# Patient Record
Sex: FEMALE | Race: WHITE
[De-identification: names, ages, dates, MRNs, and addresses within clinical notes are randomized per-mention and may not be internally consistent; named-entity substitution may affect disease eponyms.]

---

## 2020-06-27 ENCOUNTER — HOSPITAL ENCOUNTER (OUTPATIENT)
Dept: HOSPITAL 9 - MADRAD | Age: 50
Discharge: HOME | End: 2020-06-27
Attending: NURSE PRACTITIONER
Payer: OTHER GOVERNMENT

## 2020-06-27 DIAGNOSIS — M25.562: Primary | ICD-10-CM

## 2020-06-27 DIAGNOSIS — M25.512: ICD-10-CM

## 2020-06-27 DIAGNOSIS — M54.2: ICD-10-CM

## 2020-06-27 DIAGNOSIS — Z91.81: ICD-10-CM

## 2020-06-27 PROCEDURE — 72040 X-RAY EXAM NECK SPINE 2-3 VW: CPT

## 2020-06-27 NOTE — RAD
LEFT KNEE 3 VIEWS:

 

HISTORY: 

Fall.

 

FINDINGS: 

The joint spaces are normally maintained.  There is no evidence of fracture.  No evidence of joint ef
fusion.  No significant degenerative change.

 

IMPRESSION: 

Unremarkable left knee.

 

POS: [facility]

## 2020-06-27 NOTE — RAD
LEFT SHOULDER 3 VIEWS:

 

HISTORY: 

Fall with injury.

 

FINDINGS: 

Humeral head is normally positioned.  AC joint normally aligned.  No fracture.  No dislocation.  

 

IMPRESSION: 

Unremarkable left shoulder.

 

POS: [facility]

## 2020-06-27 NOTE — RAD
CERVICAL SPINE 3 VIEWS:

 

HISTORY: 

Fall with injury.

 

FINDINGS: 

Cervical vertebrae maintain normal height and alignment.  The disk spaces are normally maintained.  P
osterior elements are normally aligned.  Minimal degenerative change.

 

IMPRESSION: 

Unremarkable cervical spine.

 

POS: [facility]

## 2020-08-10 ENCOUNTER — HOSPITAL ENCOUNTER (OUTPATIENT)
Dept: HOSPITAL 9 - MADRAD | Age: 50
Discharge: HOME | End: 2020-08-10
Attending: NURSE PRACTITIONER
Payer: OTHER GOVERNMENT

## 2020-08-10 DIAGNOSIS — S99.922A: Primary | ICD-10-CM

## 2020-08-10 NOTE — RAD
LEFT FOOT 3 VIEWS:

 

HISTORY: 

Injury, left foot pain.

 

FINDINGS/IMPRESSION: 

No acute fracture or dislocation is identified.

 

POS: [facility]

## 2020-08-26 ENCOUNTER — HOSPITAL ENCOUNTER (EMERGENCY)
Dept: HOSPITAL 9 - MADERS | Age: 50
Discharge: LEFT BEFORE DISCHARGE | End: 2020-08-26
Payer: OTHER GOVERNMENT

## 2020-08-26 DIAGNOSIS — Z79.84: ICD-10-CM

## 2020-08-26 DIAGNOSIS — E78.1: ICD-10-CM

## 2020-08-26 DIAGNOSIS — L03.116: Primary | ICD-10-CM

## 2020-08-26 DIAGNOSIS — Z79.82: ICD-10-CM

## 2020-08-26 DIAGNOSIS — F32.9: ICD-10-CM

## 2020-08-26 DIAGNOSIS — I25.2: ICD-10-CM

## 2020-08-26 DIAGNOSIS — E11.40: ICD-10-CM

## 2020-08-26 DIAGNOSIS — N30.00: ICD-10-CM

## 2020-08-26 DIAGNOSIS — E78.5: ICD-10-CM

## 2020-08-26 DIAGNOSIS — F41.9: ICD-10-CM

## 2020-08-26 DIAGNOSIS — I25.10: ICD-10-CM

## 2020-08-26 DIAGNOSIS — I10: ICD-10-CM

## 2020-08-26 DIAGNOSIS — Z79.899: ICD-10-CM

## 2020-08-26 DIAGNOSIS — E78.00: ICD-10-CM

## 2020-08-26 LAB
ALBUMIN SERPL BCG-MCNC: 3 G/DL (ref 3.5–5)
ALP SERPL-CCNC: 91 U/L (ref 40–110)
ALT SERPL W P-5'-P-CCNC: 11 U/L (ref 8–55)
ANION GAP SERPL CALC-SCNC: 17 MMOL/L (ref 10–20)
APTT PPP: 25.2 SEC (ref 22.9–36.1)
AST SERPL-CCNC: 11 U/L (ref 5–34)
BACTERIA UR QL AUTO: (no result) HPF
BASOPHILS # BLD AUTO: 0.2 THOU/UL (ref 0–0.2)
BASOPHILS NFR BLD AUTO: 1.1 % (ref 0–1)
BILIRUB SERPL-MCNC: (no result) MG/DL (ref 0.2–1.2)
BUN SERPL-MCNC: 19 MG/DL (ref 9.8–20.1)
CALCIUM SERPL-MCNC: 8.4 MG/DL (ref 7.8–10.44)
CHLORIDE SERPL-SCNC: 98 MMOL/L (ref 98–107)
CO2 SERPL-SCNC: 22 MMOL/L (ref 22–29)
CREAT CL PREDICTED SERPL C-G-VRATE: 0 ML/MIN (ref 70–130)
EOSINOPHIL # BLD AUTO: 0.4 THOU/UL (ref 0–0.7)
EOSINOPHIL NFR BLD AUTO: 3.2 % (ref 0–10)
GLOBULIN SER CALC-MCNC: 4 G/DL (ref 2.4–3.5)
GLUCOSE SERPL-MCNC: 255 MG/DL (ref 70–105)
GLUCOSE UR STRIP-MCNC: 500 MG/DL
HGB BLD-MCNC: 14.3 G/DL (ref 12–16)
INR PPP: 0.8
LYMPHOCYTES # BLD AUTO: 3.1 THOU/UL (ref 1.2–3.4)
LYMPHOCYTES NFR BLD AUTO: 22.5 % (ref 21–51)
MCH RBC QN AUTO: 29.9 PG (ref 27–31)
MCV RBC AUTO: 90.2 FL (ref 78–98)
MONOCYTES # BLD AUTO: 0.8 THOU/UL (ref 0.11–0.59)
MONOCYTES NFR BLD AUTO: 5.6 % (ref 0–10)
NEUTROPHILS # BLD AUTO: 9.4 THOU/UL (ref 1.4–6.5)
NEUTROPHILS NFR BLD AUTO: 67.6 % (ref 42–75)
PLATELET # BLD AUTO: 451 THOU/UL (ref 130–400)
POTASSIUM SERPL-SCNC: 4.9 MMOL/L (ref 3.5–5.1)
PROT UR STRIP.AUTO-MCNC: (no result) MG/DL
PROTHROMBIN TIME: 11.1 SEC (ref 12–14.7)
RBC # BLD AUTO: 4.79 MILL/UL (ref 4.2–5.4)
RBC UR QL AUTO: (no result) HPF (ref 0–3)
SODIUM SERPL-SCNC: 132 MMOL/L (ref 136–145)
SP GR UR STRIP: 1.02 (ref 1–1.03)
WBC # BLD AUTO: 13.9 THOU/UL (ref 4.8–10.8)
WBC UR QL AUTO: (no result) HPF (ref 0–3)

## 2020-08-26 PROCEDURE — 87086 URINE CULTURE/COLONY COUNT: CPT

## 2020-08-26 PROCEDURE — 80053 COMPREHEN METABOLIC PANEL: CPT

## 2020-08-26 PROCEDURE — 81015 MICROSCOPIC EXAM OF URINE: CPT

## 2020-08-26 PROCEDURE — 87077 CULTURE AEROBIC IDENTIFY: CPT

## 2020-08-26 PROCEDURE — 96367 TX/PROPH/DG ADDL SEQ IV INF: CPT

## 2020-08-26 PROCEDURE — 85025 COMPLETE CBC W/AUTO DIFF WBC: CPT

## 2020-08-26 PROCEDURE — 96375 TX/PRO/DX INJ NEW DRUG ADDON: CPT

## 2020-08-26 PROCEDURE — 83605 ASSAY OF LACTIC ACID: CPT

## 2020-08-26 PROCEDURE — 81003 URINALYSIS AUTO W/O SCOPE: CPT

## 2020-08-26 PROCEDURE — 85610 PROTHROMBIN TIME: CPT

## 2020-08-26 PROCEDURE — 96365 THER/PROPH/DIAG IV INF INIT: CPT

## 2020-08-26 PROCEDURE — 85730 THROMBOPLASTIN TIME PARTIAL: CPT

## 2020-08-26 PROCEDURE — 87040 BLOOD CULTURE FOR BACTERIA: CPT

## 2020-08-26 NOTE — RAD
EXAM: 3 views of the left ankle



HISTORY: Ankle pain and swelling



COMPARISON: None



FINDINGS: 3 views of the left ankle shows no evidence of acute fracture or dislocation. Mild diffuse 
soft tissue swelling is seen. No degenerative changes are present. A chronic ossific fragment is

seen adjacent to the medial malleolus.



IMPRESSION: No evidence of acute osseous abnormality.



Reported By: Mik Benavidez 

Electronically Signed:  8/26/2020 8:28 PM

## 2020-11-19 ENCOUNTER — HOSPITAL ENCOUNTER (OUTPATIENT)
Dept: HOSPITAL 92 - BICCT | Age: 50
Discharge: HOME | End: 2020-11-19
Attending: INTERNAL MEDICINE
Payer: OTHER GOVERNMENT

## 2020-11-19 DIAGNOSIS — I70.1: ICD-10-CM

## 2020-11-19 DIAGNOSIS — K55.1: ICD-10-CM

## 2020-11-19 DIAGNOSIS — I70.213: Primary | ICD-10-CM

## 2020-11-19 PROCEDURE — 75635 CT ANGIO ABDOMINAL ARTERIES: CPT

## 2020-11-19 NOTE — CT
CTA OF THE ABDOMEN AND PELVIS WITH BILATERAL LOWER EXTREMITY RUNOFF UTILIZING IV CONTRASTAND 3D REFOR
MATTED IMAGIN20

 

COMPARISON: 

None. 

 

FINDINGS: 

 

ABDOMEN AND PELVIS:

There is mild subsegmental volume loss along both lower lobes. 

 

Gallbladder is surgically absent. The visualized liver, pancreas, adrenal glands and spleen appear wi
thin normal limits. There is mild lobulation involving the kidneys bilaterally, nonspecific. 

 

No free fluid or enlarged lymph nodes are evident within the abdomen. Small free fluid in the lower p
leigh is nonspecific. There is scattered diverticula involving the colon without evidence of active d
iverticulitis. There is a mild amount of retained stool within the colon. The appendix is not definit
ely seen. Small free fluid in the lower pelvis is nonspecific. There is scattered diverticula involvi
ng the colon without evidence of active diverticulitis. There is a mild amount of retained stool in t
he colon. The appendix is not definitely seen. Small bowel is of normal caliber. 

 

No acute osseous abnormality is evident. 

 

VASCULATURE:

There is moderate atherosclerotic irregularity involving the abdominopelvic vasculature. 

 

The celiac is widely patent. 

 

There is some mild to moderate atherosclerotic irregularity involving the mid to distal SMA with appr
oximately 30% luminal caliber narrowing.

 

There is moderate 50% luminal caliber narrowing involving the origin and proximal aspect of the right
 renal artery. There is mild luminal narrowing involving the proximal left renal artery. TIFFANY is paten
t. 

 

There is high grade stenosis approaching near occlusion involving both common iliac arteries with mul
tiple segments of the common iliac arteries bilaterally. 

 

There is high grade 75% stenosis involving the right external iliac artery. There is 60 to 75% stenos
is involving the right common femoral artery. There is multifocal high grade stenosis involving the m
id to distal right superficial femoral artery. There is multifocal high grade stenosis involving the 
right popliteal artery and tibioperoneal trunk. There is at least two vessel runoff to the level of t
he ankle via the peroneal and anterior tibial artery. 

 

There is complete occlusion involving the origin of the left external iliac artery. There is some rec
onstitution of flow within the proximal left external iliac artery. There is multifocal mild to moder
ate atherosclerotic irregularity. There is moderate to high grade stenosis involving the common femor
al artery. There is complete occlusion of the left superficial femoral artery with reconstitution at 
the level of the distal SFA near the adductor hiatus. There is multifocal high grade stenosis involvi
ng the left popliteal artery with more reconstitution seen at the distal left popliteal artery. Tibio
peroneal trunk is patent. The anterior tibial artery is occluded from its origin but reconstitutes wi
thin the proximal foreleg. There is at least two vessel runoff via the peroneal and anterior tibial a
rtery to the level of the ankle. 

 

IMPRESSION: 

1.      Severe atherosclerotic disease of both lower extremities. There is multifocal near complete o
cclusion of both common iliac arteries. There is complete occlusion of the origin of the left externa
l iliac artery. There is complete occlusion of the left superficial femoral artery with reconstitutio
n at the level of the distal SFA and popliteal artery. There is two vessel runoff to the level of the
 ankle via the anterior tibial artery and left peroneal artery. The left anterior tibial artery is oc
cluded from its origin but does reconstitute within the proximal foreleg. 

2.      Severe diffuse atherosclerotic narrowing involving the right external iliac artery with sever
e narrowing involving the right common femoral artery. There is multifocal high grade stenosis involv
ing the right superficial femoral artery and right popliteal artery. There is occlusion of the right 
posterior tibial artery with two vessel runoff to the level of the right ankle via the right anterior
 tibial artery and right peroneal artery. 

3.      Moderate narrowing involving the origin of the proximal aspect of the right renal artery. Mil
d narrowing involving the proximal aspect of the left renal artery. 

4.      Mild to moderate narrowing involving the mid to distal SMA. 

 

POS: [facility]

## 2020-11-26 ENCOUNTER — HOSPITAL ENCOUNTER (INPATIENT)
Dept: HOSPITAL 92 - ERS | Age: 50
LOS: 5 days | Discharge: HOME | DRG: 253 | End: 2020-12-01
Attending: INTERNAL MEDICINE | Admitting: INTERNAL MEDICINE
Payer: OTHER GOVERNMENT

## 2020-11-26 VITALS — BODY MASS INDEX: 24.7 KG/M2

## 2020-11-26 DIAGNOSIS — Z88.0: ICD-10-CM

## 2020-11-26 DIAGNOSIS — E11.65: ICD-10-CM

## 2020-11-26 DIAGNOSIS — E11.51: ICD-10-CM

## 2020-11-26 DIAGNOSIS — Z88.5: ICD-10-CM

## 2020-11-26 DIAGNOSIS — B18.2: ICD-10-CM

## 2020-11-26 DIAGNOSIS — E11.621: ICD-10-CM

## 2020-11-26 DIAGNOSIS — Z79.84: ICD-10-CM

## 2020-11-26 DIAGNOSIS — E11.42: ICD-10-CM

## 2020-11-26 DIAGNOSIS — L97.529: ICD-10-CM

## 2020-11-26 DIAGNOSIS — F32.9: ICD-10-CM

## 2020-11-26 DIAGNOSIS — E78.00: ICD-10-CM

## 2020-11-26 DIAGNOSIS — I11.0: ICD-10-CM

## 2020-11-26 DIAGNOSIS — I48.91: ICD-10-CM

## 2020-11-26 DIAGNOSIS — M19.90: ICD-10-CM

## 2020-11-26 DIAGNOSIS — I70.248: ICD-10-CM

## 2020-11-26 DIAGNOSIS — Z88.8: ICD-10-CM

## 2020-11-26 DIAGNOSIS — I25.5: ICD-10-CM

## 2020-11-26 DIAGNOSIS — N17.9: ICD-10-CM

## 2020-11-26 DIAGNOSIS — I96: ICD-10-CM

## 2020-11-26 DIAGNOSIS — E78.5: ICD-10-CM

## 2020-11-26 DIAGNOSIS — Z95.1: ICD-10-CM

## 2020-11-26 DIAGNOSIS — L03.116: ICD-10-CM

## 2020-11-26 DIAGNOSIS — Z53.29: ICD-10-CM

## 2020-11-26 DIAGNOSIS — I25.10: ICD-10-CM

## 2020-11-26 DIAGNOSIS — F41.9: ICD-10-CM

## 2020-11-26 DIAGNOSIS — E87.5: ICD-10-CM

## 2020-11-26 DIAGNOSIS — E11.52: Primary | ICD-10-CM

## 2020-11-26 DIAGNOSIS — I25.2: ICD-10-CM

## 2020-11-26 DIAGNOSIS — Z90.49: ICD-10-CM

## 2020-11-26 DIAGNOSIS — L97.829: ICD-10-CM

## 2020-11-26 DIAGNOSIS — Z79.899: ICD-10-CM

## 2020-11-26 LAB
ALBUMIN SERPL BCG-MCNC: 3.1 G/DL (ref 3.5–5)
ALP SERPL-CCNC: 107 U/L (ref 40–110)
ALT SERPL W P-5'-P-CCNC: (no result) U/L (ref 8–55)
ANION GAP SERPL CALC-SCNC: 13 MMOL/L (ref 10–20)
ANION GAP SERPL CALC-SCNC: 16 MMOL/L (ref 10–20)
AST SERPL-CCNC: 9 U/L (ref 5–34)
BASOPHILS # BLD AUTO: 0.1 THOU/UL (ref 0–0.2)
BASOPHILS NFR BLD AUTO: 0.5 % (ref 0–1)
BILIRUB SERPL-MCNC: 0.2 MG/DL (ref 0.2–1.2)
BUN SERPL-MCNC: 17 MG/DL (ref 9.8–20.1)
BUN SERPL-MCNC: 22 MG/DL (ref 9.8–20.1)
CALCIUM SERPL-MCNC: 7.7 MG/DL (ref 7.8–10.44)
CALCIUM SERPL-MCNC: 8.8 MG/DL (ref 7.8–10.44)
CHLORIDE SERPL-SCNC: 103 MMOL/L (ref 98–107)
CHLORIDE SERPL-SCNC: 110 MMOL/L (ref 98–107)
CO2 SERPL-SCNC: 19 MMOL/L (ref 22–29)
CO2 SERPL-SCNC: 24 MMOL/L (ref 22–29)
CREAT CL PREDICTED SERPL C-G-VRATE: 0 ML/MIN (ref 70–130)
CREAT CL PREDICTED SERPL C-G-VRATE: 50 ML/MIN (ref 70–130)
CRP SERPL-MCNC: 7.18 MG/DL
EOSINOPHIL # BLD AUTO: 0.3 THOU/UL (ref 0–0.7)
EOSINOPHIL NFR BLD AUTO: 1.6 % (ref 0–10)
GLOBULIN SER CALC-MCNC: 4.4 G/DL (ref 2.4–3.5)
GLUCOSE SERPL-MCNC: 206 MG/DL (ref 70–105)
GLUCOSE SERPL-MCNC: 234 MG/DL (ref 70–105)
HGB BLD-MCNC: 12.5 G/DL (ref 12–16)
LYMPHOCYTES # BLD: 1.9 THOU/UL (ref 1.2–3.4)
LYMPHOCYTES NFR BLD AUTO: 10.7 % (ref 21–51)
MCH RBC QN AUTO: 30.6 PG (ref 27–31)
MCV RBC AUTO: 92.8 FL (ref 78–98)
MONOCYTES # BLD AUTO: 1.3 THOU/UL (ref 0.11–0.59)
MONOCYTES NFR BLD AUTO: 7.7 % (ref 0–10)
NEUTROPHILS # BLD AUTO: 13.8 THOU/UL (ref 1.4–6.5)
NEUTROPHILS NFR BLD AUTO: 79.6 % (ref 42–75)
PLATELET # BLD AUTO: 325 THOU/UL (ref 130–400)
POTASSIUM SERPL-SCNC: 4.2 MMOL/L (ref 3.5–5.1)
POTASSIUM SERPL-SCNC: 5.8 MMOL/L (ref 3.5–5.1)
RBC # BLD AUTO: 4.08 MILL/UL (ref 4.2–5.4)
SODIUM SERPL-SCNC: 137 MMOL/L (ref 136–145)
SODIUM SERPL-SCNC: 138 MMOL/L (ref 136–145)
WBC # BLD AUTO: 17.4 THOU/UL (ref 4.8–10.8)

## 2020-11-26 PROCEDURE — 87070 CULTURE OTHR SPECIMN AEROBIC: CPT

## 2020-11-26 PROCEDURE — 76942 ECHO GUIDE FOR BIOPSY: CPT

## 2020-11-26 PROCEDURE — 93010 ELECTROCARDIOGRAM REPORT: CPT

## 2020-11-26 PROCEDURE — 83605 ASSAY OF LACTIC ACID: CPT

## 2020-11-26 PROCEDURE — 36416 COLLJ CAPILLARY BLOOD SPEC: CPT

## 2020-11-26 PROCEDURE — U0003 INFECTIOUS AGENT DETECTION BY NUCLEIC ACID (DNA OR RNA); SEVERE ACUTE RESPIRATORY SYNDROME CORONAVIRUS 2 (SARS-COV-2) (CORONAVIRUS DISEASE [COVID-19]), AMPLIFIED PROBE TECHNIQUE, MAKING USE OF HIGH THROUGHPUT TECHNOLOGIES AS DESCRIBED BY CMS-2020-01-R: HCPCS

## 2020-11-26 PROCEDURE — 85347 COAGULATION TIME ACTIVATED: CPT

## 2020-11-26 PROCEDURE — 93005 ELECTROCARDIOGRAM TRACING: CPT

## 2020-11-26 PROCEDURE — 71045 X-RAY EXAM CHEST 1 VIEW: CPT

## 2020-11-26 PROCEDURE — 85025 COMPLETE CBC W/AUTO DIFF WBC: CPT

## 2020-11-26 PROCEDURE — 93306 TTE W/DOPPLER COMPLETE: CPT

## 2020-11-26 PROCEDURE — 37221: CPT

## 2020-11-26 PROCEDURE — 86140 C-REACTIVE PROTEIN: CPT

## 2020-11-26 PROCEDURE — 94640 AIRWAY INHALATION TREATMENT: CPT

## 2020-11-26 PROCEDURE — 87077 CULTURE AEROBIC IDENTIFY: CPT

## 2020-11-26 PROCEDURE — 0LBM0ZZ EXCISION OF LEFT UPPER LEG TENDON, OPEN APPROACH: ICD-10-PCS | Performed by: SURGERY

## 2020-11-26 PROCEDURE — 87635 SARS-COV-2 COVID-19 AMP PRB: CPT

## 2020-11-26 PROCEDURE — 80202 ASSAY OF VANCOMYCIN: CPT

## 2020-11-26 PROCEDURE — 87205 SMEAR GRAM STAIN: CPT

## 2020-11-26 PROCEDURE — 87040 BLOOD CULTURE FOR BACTERIA: CPT

## 2020-11-26 PROCEDURE — C1725 CATH, TRANSLUMIN NON-LASER: HCPCS

## 2020-11-26 PROCEDURE — 36415 COLL VENOUS BLD VENIPUNCTURE: CPT

## 2020-11-26 PROCEDURE — 96365 THER/PROPH/DIAG IV INF INIT: CPT

## 2020-11-26 PROCEDURE — 87186 SC STD MICRODIL/AGAR DIL: CPT

## 2020-11-26 PROCEDURE — 80053 COMPREHEN METABOLIC PANEL: CPT

## 2020-11-26 PROCEDURE — 96375 TX/PRO/DX INJ NEW DRUG ADDON: CPT

## 2020-11-26 PROCEDURE — 85652 RBC SED RATE AUTOMATED: CPT

## 2020-11-26 PROCEDURE — 83880 ASSAY OF NATRIURETIC PEPTIDE: CPT

## 2020-11-26 PROCEDURE — 80048 BASIC METABOLIC PNL TOTAL CA: CPT

## 2020-11-26 RX ADMIN — INSULIN LISPRO PRN UNIT: 100 INJECTION, SOLUTION INTRAVENOUS; SUBCUTANEOUS at 22:44

## 2020-11-26 RX ADMIN — CLINDAMYCIN PHOSPHATE SCH MLS: 900 INJECTION, SOLUTION INTRAVENOUS at 11:51

## 2020-11-26 RX ADMIN — CLINDAMYCIN PHOSPHATE SCH MLS: 900 INJECTION, SOLUTION INTRAVENOUS at 18:15

## 2020-11-26 NOTE — PDOC.HHP
Hospitalist HPI





- History of Present Illness


Left lower extremity wound pain


History of Present Illness: 





Ms. Almazan is a 49-year-old female with a past medical history of severe 

peripheral vascular disease, chronic wound to left lower extremity, type 2 

diabetes mellitus, hypertension, hyperlipidemia, anxiety and depression, 

hepatitis C, coronary artery disease status post CABG who presents to the 

emergency room for worsening left lower extremity wound pain.  Patient has a 

chronic nonhealing arterial ulcer to her left lower shin.  She has a history of 

multiple wound infections with recent admission in August for infected ulcer.  

Cultures at that time were positive for staph aureus.  Patient reports that over

the past few days she has noticed increased pain to her left lower extremity and

increasing redness.  There was plans for her to start a wound VAC as an 

outpatient sometime next week.  Also of note patient underwent on 1119 aorta CTA

with runoff which revealed severe peripheral vascular disease.





In emergency room initial vital signs 151/117, 70, 18, 95% on room air, 98.3.  

EKG showed normal sinus rhythm.  H/H 12.5/37.9, white blood cell count 17.4.  

BUN/CR 22/1.7.  Sodium 137, potassium 5.8.  Glucose 234.  Lactic acid 1.1 left 

lower extremity x-ray of tib-fib showed no signs of osseous changes, but 

question of air and subcutaneous tissues.  Patient received vancomycin, cefepime

and 2 L of normal saline in the emergency room.





Hospitalist ROS





- Review of Systems


Constitutional: reports: malaise.  denies: fever, chills, sweats, weakness, 

other


Eyes: denies: pain, vision change, conjunctivae inflammation, eyelid 

inflammation, redness, other


ENT: denies: ear pain, ear discharge, nose pain, nose discharge, nose 

congestion, mouth pain, mouth swelling, throat pain, throat swelling, other


Respiratory: denies: cough, dry, shortness of breath, hemoptysis, SOB with 

excertion, pleuritic pain, sputum, wheezing, other


Cardiovascular: denies: chest pain, palpitations, orthopnea, paroxysmal noc. 

dyspnea, edema, light headedness, other


Gastrointestinal: denies: nausea, vomiting, abdominal pain, diarrhea, 

constipation, melena, hematochezia, other


Genitourinary: denies: dysuria, frequency, incontinence, hematuria, retention, 

other


Musculoskeletal: reports: leg pain, foot pain.  denies: neck pain, shoulder 

pain, arm pain, back pain, hand pain, other


Skin: reports: rash, lesions


Neurological: denies: weakness, numbness, incoordination, change in speech, 

confusion, seizures, other





- Medication


Medications: 


Current medications include





Clopidogrel 75 mg daily


Furosemide 40 mg daily as needed


Gabapentin 100 mg twice daily as needed


Metformin 1000 mg twice daily


Metoprolol 25 mg twice daily


Nitroglycerin 0.4 mg as needed


Lipitor 80 mg daily


Citalopram 40 mg daily


Januvia 100 mg daily





Patient reports allergy to meperidine, codeine, penicillin





Hospitalist History





- Past Medical History


Other Medical History: 





Past medical history includes





Severe peripheral vascular disease


Type 2 diabetes


Hypertension


Hyperlipidemia


Anxiety and depression


Chronic arterial ulcer to left lower extremity with recurrent cellulitis


Hepatitis C


Coronary artery disease


Myocardial infarct





- Past Surgical History


Other Surgical History: 





Past surgical history includes





- Family History


Other Family History: 





Family history of father with peripheral vascular disease who passed at age 46





- Social History


Smoking Status: Never smoker


Alcohol: reports: None


Drugs: reports: none


Living Situation: With Family


Activity level: independent ambulation





- Exam


General Appearance: NAD, awake alert


Eye: PERRL, anicteric sclera


ENT: normocephalic atraumatic, no oropharyngeal lesions, moist mucosa


Neck: supple, symmetric, no JVD, no thyromegaly, no lymphadenopathy, no carotid 

bruit


Heart: RRR, no murmur, no gallops, no rubs, normal peripheral pulses


Heart - other findings: Dopplerable pulses with monophasic waveforms to DP, PT


Respiratory: CTAB, no wheezes, no rales, no ronchi, normal chest expansion, no 

tachypnea, normal percussion


Gastrointestinal: soft, non-tender, non-distended, normal bowel sounds, no 

palpable masses, no hepatomegaly, no splenomegaly, no bruit


Extremities - other findings: Extensive arterial ulcer with dry gangrene to left

lower extremity. 


Skin: negative: no lesions (Bone and tendon exposed)


Skin - other findings: Area surrounding ulcer erythematous and indurated


Neurological: cranial nerve grossly intact, normal sensation to touch, no 

weakness, no focal deficits, no new deficit


Musculoskeletal: normal tone, normal strength


Psychiatric: normal affect, normal behavior, A&O x 3, lethargic





Hospitalist Results





- Labs


Result Diagrams: 


                                 11/26/20 02:10





                                 11/26/20 09:15


Lab results: 


                                        











WBC  17.4 thou/uL (4.8-10.8)  H  11/26/20  02:10    


 


Hgb  12.5 g/dL (12.0-16.0)   11/26/20  02:10    


 


Hct  37.9 % (36.0-47.0)   11/26/20  02:10    


 


MCV  92.8 fL (78.0-98.0)   11/26/20  02:10    


 


Plt Count  325 thou/uL (130-400)   11/26/20  02:10    


 


Neutrophils %  79.6 % (42.0-75.0)  H  11/26/20  02:10    


 


ESR Westergren  46 mm/hr (Less than 30)  H  11/26/20  02:10    


 


Sodium  137 mmol/L (136-145)   11/26/20  02:10    


 


Potassium  5.8 mmol/L (3.5-5.1)  H  11/26/20  02:10    


 


Chloride  103 mmol/L ()   11/26/20  02:10    


 


Carbon Dioxide  24 mmol/L (22-29)   11/26/20  02:10    


 


BUN  22 mg/dL (9.8-20.1)  H  11/26/20  02:10    


 


Creatinine  1.71 mg/dL (0.6-1.1)  H  11/26/20  02:10    


 


Glucose  234 mg/dL ()  H  11/26/20  02:10    


 


Lactic Acid  1.1 mmol/L (0.5-2.2)   11/26/20  02:10    


 


Calcium  8.8 mg/dL (7.8-10.44)   11/26/20  02:10    


 


Total Bilirubin  0.2 mg/dL (0.2-1.2)   11/26/20  02:10    


 


AST  9 U/L (5-34)   11/26/20  02:10    


 


ALT  Less than  7 U/L (8-55)  L  11/26/20  02:10    


 


Alkaline Phosphatase  107 U/L ()   11/26/20  02:10    


 


C-Reactive Protein  7.18 mg/dL (= or < 0.5)  H  11/26/20  02:10    


 


Serum Total Protein  7.5 g/dL (6.0-8.3)   11/26/20  02:10    


 


Albumin  3.1 g/dL (3.5-5.0)  L  11/26/20  02:10    














Hospitalist H&P A/P





- Plan


Plan: 





Infected arterial ulcer


49-year-old female with severe peripheral vascular disease and chronic 

nonhealing arterial ulcer to left lower extremity presents with acute pain and 

worsening redness to surrounding skin. WBC 17.4, Lactic acid 2.1. Currently not 

meeting SIRS criteria. Concern for osteomyelitis giving extent of wound.  

Initial plain film showed no signs of bone abnormalities and question of air in 

subcutaneous tissues.  The wound is very extensive and there is possibility that

this area may be due to tracking, however will add clindamycin for anaerobic 

coverage and obtain stat MRI to further assess for osteomyelitis or gas 

gangrene. Wound does have dry gangrene. Bone and tendon exposed.  No crepitus on

exam.  DP and PT pulses  dopperable, but monophasic. There was reportedly plan 

for patient to start a wound VAC as an outpatient in the coming week however 

patient's pain and redness made her present to the ER.  While a wound VAC may 

help to a small extent, likely patient needs vascular intervention to restore 

blood supply in order for wound to heal. Will consult cardiovascular surgery for

further recommendations.





Plan


Stat MRI of left lower extremity wound


Continue IV vancomycin, cefepime, clindamycin


Trend white blood cell count, fever curve, lactic acid


Cardiovascular surgery consult


Stat General surgery consult for question nec fasc





Peripheral vascular disease


Significant peripheral vascular disease.  Patient had CTA aorta with runoff 

which showed near complete occlusion of bilateral iliacs, complete occlusion of 

left SFA with reconstitution.  Patient does have two-vessel runoff to left lower

extremity.  DP and PT pulses dopplerable but monophasic.  Patient with dependent

rubor.  Leg is warm.  Patient on aspirin and Plavix at home.





Plan


Continue aspirin, Plavix


Cardiovascular consult for question vascular interventionrecommendations 

appreciated





Acute kidney injury


On presentation BUN/CR 22/1.71.  Baseline creatinine less than 1.  Patient 

received 2 L of IV fluid in emergency room.  Likely prerenal.





Plan


Continue to monitor kidney function


Avoid nephrotoxic agents when possible


Renal dosing as appropriate





Hyperkalemia


Patient with elevated potassium to 5.8.  BUN/CR 22/1.71.  No EKG changes. 

Calcium to stabilize myocardium. Will repeat to confirm sample has not hemolyzed

and further treat if needed.





Plan


-Calcium gluconate


-STAT repeat BMP





Coronary artery disease


History of coronary artery disease status post CABG.  Patient on aspirin, 

Plavix, metoprolol, statin.  We will continue home medications.





Plan


Continue aspirin, statin


Continue Plavix, metoprolol





Type 2 diabetes mellitus


Type 2 diabetes on Metformin.  Will hold in setting of BRIANNA.  Will place patient 

on insulin sliding scale and ACHS glucose checks.





Plan


ISS


ACHS glucose checks


Hold home Metformin





Hyperlipidemia


Continue home statin





Hepatitis C


Patient with history of hepatitis C infection.  LFTs within normal limits.  Does

not appear that patient has undergone treatment for this.  Patient will need 

outpatient follow-up with primary care for further management.





Anxiety/depression


Continue home Celexa.  Patient denies SI/HI.





DVT prophylaxis - heparin SQ


FULL CODE





Case discussed with attending physician, Dr. Guerrero.

## 2020-11-26 NOTE — CON
DATE OF CONSULTATION:  11/26/2020



HISTORY OF PRESENT ILLNESS:  Ms. Almazan is a 49-year-old woman who has a

longstanding left leg anterior compartment open wound with dry gangrene.  There are

exposed tendons and her tibia is exposed.  She has known peripheral vascular disease

and seen by Dr. Burton in the past.  There have been plans made for angiograms at

some point, but this has been performed by the [facility]. 



She does walk at home.  She does not describe any claudication, although she does

not walk much with this open wound currently.  She smoked up until one year ago when

she had coronary bypass surgery.  This was performed in [place].  She had a CT

angiogram performed in earlier November, which shows bilateral iliac stenoses with

short-segment occlusion on the left.  She has an occluded left superficial femoral

artery.  Popliteal artery reconstitutes via collaterals with runoff into the lower

leg.  On the right, she has similar appearing iliofemoral segment.  Her superficial

femoral artery has multifocal disease. 



PAST MEDICAL HISTORY:  

1. Coronary artery disease.

2. Peripheral vascular disease.

3. Diabetes mellitus.

4. Hypertension.

5. Dyslipidemia.

6. Anxiety.

7. Depression.

8. Hepatitis C.



PAST SURGICAL HISTORY:  Coronary artery bypass grafting.



SOCIAL HISTORY:  She quit smoking a year ago.  She does not use alcohol.



CURRENT MEDICATIONS:  

1. Plavix 75 mg daily.

2. Lasix 40 mg daily p.r.n.

3. Gabapentin 100 mg b.i.d.

4. Metformin 1000 mg b.i.d.

5. Metoprolol 25 mg daily.

6. Lipitor 80 mg daily.

7. Citalopram 40 mg daily.

8. Januvia 100 mg daily.



ALLERGIES:  MEPERIDINE, CODEINE, PENICILLIN, AND ORANGE.



REVIEW OF SYSTEMS:  A 10-point review of systems is performed and is negative except

as above. 



PHYSICAL EXAMINATION:

GENERAL:  This is a well-developed, well-nourished diminutive woman. 

VITAL SIGNS:  Height 5 feet 2 inches, weight is 129 pounds, temperature is 98.4,

pulse is 68 and regular, and blood pressure is 128/74. 

HEENT:  Sclerae nonicteric. 

NECK:  Supple with no adenopathy.  She does not have carotid bruits. 

CHEST:  Clear bilaterally. 

HEART:  Rhythm is regular without murmur. 

ABDOMEN:  Soft and nontender with no mass. 

EXTREMITIES:  Over her left shin, she has a large approximately 15 to 20 cm long

open area with exposed tendon and tibia.  She has another abrasion on her knee.  On

the right leg, there are scratches all over her leg, which she says came from a cat.

 She has small ulcerations on the tips of her toes 1 through 3. 

VASCULAR:  I cannot palpate femoral pulses.  She has monophasic Doppler signals in

both femorals and popliteal arteries.  On the right, she has a monophasic dorsalis

pedis. 



ASSESSMENT AND PLAN:  Unsalvageable left lower leg.  She is going to need an

amputation.  I think to try to give a below-knee amputation as much possibility that

heals we can.  It would be good if we can re-establish straight line flow down into

her femoral system by intervening on her iliac artery on the left.  I have discussed

angiograms and intervention with her and we will make plans for tomorrow. 







Job ID:  [id number]

## 2020-11-26 NOTE — CON
DATE OF CONSULTATION:  11/26/2020



REASON FOR CONSULTATION:  Left leg wound.



HISTORY OF PRESENT ILLNESS:  Ms. Almazan is a 49-year-old woman with known 
peripheral

vascular disease, who has had a wound on her left shin for 6 months.  She states

that she had been seeing Wound Care and they were trying to order a VAC for her,
but

due to various problems, they were unable to get this done and the ulcer has 
gotten

bigger.  She was apparently admitted in August for the same problem and treated 
with

antibiotics and wound care.  She has had increasing pain and redness to her left
leg

for the past few days to a week before she came to the hospital, so she decided 
to

come in and she was admitted through the emergency room. 



PAST MEDICAL HISTORY:  

1. Coronary artery disease status post bypass artery grafting using right 
saphenous

vein. 

2. Peripheral vascular disease with rest pain and ulceration.

3. Type 2 diabetes.

4. Hypertension.

5. Hyperlipidemia.

6. Anxiety.

7. Depression.

8. Hepatitis C.



MEDICATIONS:  Outpatient medications:

1. Plavix.

2. Lasix.

3. Gabapentin.

4. Metformin.

5. Metoprolol.

6. Nitroglycerin.

7. Lipitor.

8. Citalopram.

9. Januvia. 

Inpatient medications:

1. Amlodipine.

2. Lipitor.

3. Cefepime.

4. Celexa.

5. Clindamycin.

6. Plavix.

7. Gabapentin.

8. Sliding scale insulin.

9. Metoprolol.

10. Remeron.

11. Vancomycin.

12. Multiple p.r.n.



ALLERGIES:  CODEINE, MEPERIDINE, AND PENICILLIN, CAUSE A RASH.



PAST SURGICAL HISTORY:  Coronary artery bypass grafting.



FAMILY HISTORY:  Vascular disease.



SOCIAL HISTORY:  The patient does not smoke, use illicit drugs, or alcohol.  She
is

ambulatory, although this week she has not really been able to walk due to pain 
in

her foot. 



REVIEW OF SYSTEMS:  Ten system review of systems is negative except for the

following:  The patient does report rest pain, having to dangle her legs over 
the

edge of the bed in order to sleep at night.  She has constant pain in her left 
leg

due to the ulcer.  She has a small scab on the right foot, which has been 
present

for many months, but no ulcerations.  She has occasional chest pain, but is 
unable

to identify the triggers.  Her  feels that it is brought on by stress and

pain.  She denies shortness of breath or orthopnea.  She has some mild lower

abdominal discomfort, which is intermittent. 



PHYSICAL EXAMINATION:

VITAL SIGNS:  The patient is afebrile, heart rate 68, respirations 17, 98% 
saturated

on room air, and blood pressure 128/74. 

GENERAL:  An anxious-appearing woman, in moderate distress.  She is not flushed 
or

toxic in appearance.  She is not jaundiced or icteric. 

HEENT:  Unremarkable.  Pupils and facial movements symmetric. 

NECK:  Supple without lymphadenopathy or thyroid nodules. 

HEART:  Regular in its rate and rhythm without murmurs, rubs, or gallops. 

LUNGS:  Clear to auscultation bilaterally. 

ABDOMEN:  Soft and nondistended.  She has some minimal lower abdominal 
tenderness,

but no rigidity, rebound, or guarding and no palpable masses or hernias. 

EXTREMITIES:  Cool and poorly perfused.  She has shiny skin to both lower legs 
as

well as some muscle wasting.  She has no palpable pulses in the groin, 
popliteal,

dorsalis pedis, or posterior tibial areas.  She has a large full-thickness 
eschar

with exposed tendon overlying the left anterolateral shin with necrotic tissue 
and

eschar visible.  She has erythema of the leg below at this level.  She has a 
small

eschar of the left knee without surrounding erythema and with no fluctuance.  
The

eschar is slightly mobile and starting to come up along the edges.  She has a 
small

scab like lesion on her right foot, which she states has been present for 
several

months.  No ulcerations or cellulitis, however, on the right foot. 

NEUROLOGIC:  The patient has very limited dorsiflexion of the ankle and fairly

normal dorsiflexion of the toes.  Grossly intact sensation to light touch. 



RADIOLOGIC DATA:  MRI did not show any obvious osteomyelitis, although this was

limited by noncontrast study.  Plain films of the tibia and fibula were 
questionable

for possible gas in the soft tissues. 



LABORATORY DATA:  White count is elevated at 17,000 with a left shift, 
hematocrit

37, and platelets 325.  Electrolytes unremarkable.  Creatinine mildly elevated 
at

1.23, bicarb slightly low at 19, lactate normal at 1.1, C-reactive protein is

elevated at 7.18.  LFTs are unremarkable. 



ASSESSMENT:  Severe peripheral vascular disease with gangrene and rest pain and

imminent risk for loss of limb.  Dr. Bueno of CT Surgery has been consulted and

plans to do an angio tomorrow to see if he can improve the blood flow to her 
legs.

At this point, the patient is unlikely to heal debridement or a below-knee

amputation of the left leg and she already has exposed nonviable tendons.  
Attempted

limb salvage with wound care runs the risk of poor function of the foot with 
footdrop

and fall risk.  I have recommended a below-knee amputation, but the patient is 
not

ready to consider that at this time.  I will debride the wound at the bedside to
try

to define the depths of the wound and also determine whether there is any

significant tunneling, which might make below-knee amputation non-feasible.

Currently, it does appear that she has enough non-involved skin and soft-tissue 
to

perform a closed below-knee amputation, but if there is significant tunneling, 
then

this may not be the case.  The patient is agreeable with this plan.  We will get
the

wound care team involved tomorrow, but plan to just do wet-to-dry dressings 
after

debridement today.  I suspect that eventually the patient will decide to proceed
with

below-knee amputation. 







Job ID:  [id number]



MTDD

## 2020-11-26 NOTE — MRI
EXAM: MRI of the left tibia/fibula without contrast



HISTORY: Wound lower left leg.  Evaluate for osteomyelitis



COMPARISON:  x-ray 11/26/2020



TECHNIQUE: Multiplanar multisequence MR images were obtained of the left lower leg without contrast.



FINDINGS:

No marrow signal abnormality is seen in the visualized bones to suggest osteomyelitis. Soft tissue ed
ema is seen in the distal aspect of the leg. No focal fluid collection is seen in the soft tissues.



The muscles and tendons appear intact.



IMPRESSION: No significant marrow signal abnormality to suggest osteomyelitis. Please note that evalu
ation is limited without IV contrast.



Reported By: Mik Benavidez 

Electronically Signed:  11/26/2020 11:23 AM

## 2020-11-26 NOTE — RAD
TWO VIEWS LEFT TIBIA AND FIBULA:

 

HISTORY: 

Chronic left leg pain.  Wound infection with possible osteomyelitis.

 

FINDINGS: 

Two views of the left tibia/fibula show no evidence of acute fracture or dislocation.  There may be a
ir in the soft tissues in the lower leg.  No underlying osseous erosions are seen to suggest osteomye
litis.  No degenerative changes are seen in the knee or ankle.

 

IMPRESSION: 

No evidence of acute osseous abnormality.

 

POS: EAA

## 2020-11-27 LAB
ANION GAP SERPL CALC-SCNC: 14 MMOL/L (ref 10–20)
BASOPHILS # BLD AUTO: 0 THOU/UL (ref 0–0.2)
BASOPHILS NFR BLD AUTO: 0.3 % (ref 0–1)
BUN SERPL-MCNC: 13 MG/DL (ref 9.8–20.1)
CALCIUM SERPL-MCNC: 7.8 MG/DL (ref 7.8–10.44)
CHLORIDE SERPL-SCNC: 109 MMOL/L (ref 98–107)
CO2 SERPL-SCNC: 17 MMOL/L (ref 22–29)
CREAT CL PREDICTED SERPL C-G-VRATE: 56 ML/MIN (ref 70–130)
EOSINOPHIL # BLD AUTO: 0.1 THOU/UL (ref 0–0.7)
EOSINOPHIL NFR BLD AUTO: 0.5 % (ref 0–10)
GLUCOSE SERPL-MCNC: 144 MG/DL (ref 70–105)
HGB BLD-MCNC: 11.7 G/DL (ref 12–16)
LYMPHOCYTES # BLD: 1.7 THOU/UL (ref 1.2–3.4)
LYMPHOCYTES NFR BLD AUTO: 10.8 % (ref 21–51)
MCH RBC QN AUTO: 29 PG (ref 27–31)
MCV RBC AUTO: 91 FL (ref 78–98)
MONOCYTES # BLD AUTO: 0.9 THOU/UL (ref 0.11–0.59)
MONOCYTES NFR BLD AUTO: 6 % (ref 0–10)
NEUTROPHILS # BLD AUTO: 12.6 THOU/UL (ref 1.4–6.5)
NEUTROPHILS NFR BLD AUTO: 82.3 % (ref 42–75)
PLATELET # BLD AUTO: 342 THOU/UL (ref 130–400)
POTASSIUM SERPL-SCNC: 4.3 MMOL/L (ref 3.5–5.1)
RBC # BLD AUTO: 4.02 MILL/UL (ref 4.2–5.4)
SODIUM SERPL-SCNC: 136 MMOL/L (ref 136–145)
WBC # BLD AUTO: 15.3 THOU/UL (ref 4.8–10.8)

## 2020-11-27 PROCEDURE — B4101ZZ FLUOROSCOPY OF ABDOMINAL AORTA USING LOW OSMOLAR CONTRAST: ICD-10-PCS | Performed by: THORACIC SURGERY (CARDIOTHORACIC VASCULAR SURGERY)

## 2020-11-27 PROCEDURE — 047J34Z DILATION OF LEFT EXTERNAL ILIAC ARTERY WITH DRUG-ELUTING INTRALUMINAL DEVICE, PERCUTANEOUS APPROACH: ICD-10-PCS | Performed by: THORACIC SURGERY (CARDIOTHORACIC VASCULAR SURGERY)

## 2020-11-27 PROCEDURE — 047D34Z DILATION OF LEFT COMMON ILIAC ARTERY WITH DRUG-ELUTING INTRALUMINAL DEVICE, PERCUTANEOUS APPROACH: ICD-10-PCS | Performed by: THORACIC SURGERY (CARDIOTHORACIC VASCULAR SURGERY)

## 2020-11-27 RX ADMIN — INSULIN LISPRO PRN UNIT: 100 INJECTION, SOLUTION INTRAVENOUS; SUBCUTANEOUS at 16:41

## 2020-11-27 RX ADMIN — CLINDAMYCIN PHOSPHATE SCH MLS: 900 INJECTION, SOLUTION INTRAVENOUS at 01:15

## 2020-11-27 RX ADMIN — VANCOMYCIN HYDROCHLORIDE SCH MLS: 750 INJECTION, POWDER, LYOPHILIZED, FOR SOLUTION INTRAVENOUS at 02:40

## 2020-11-27 NOTE — PDOC.HOSPP
- Subjective


Encounter Date: 11/27/20


Encounter Time: 08:00


Subjective: 


No overnight events.  Patient continues to endorse pain to her left lower 

extremity.  Denies chest pain, shortness of breath, abdominal pain.  Denies 

fever/night sweats/chills.





Chart medications reviewed.





- Objective


Vital Signs & Weight: 


                             Vital Signs (12 hours)











  Temp Pulse Resp BP Pulse Ox


 


 11/27/20 04:00  97.8 F  81  16  137/63  95


 


 11/27/20 00:00     113/58 L  90 L








                                     Weight











Weight                         135 lb 8 oz














I&O: 


                                        











 11/26/20 11/27/20 11/28/20





 06:59 06:59 06:59


 


Intake Total  2275 


 


Output Total  225 


 


Balance  2050 











Result Diagrams: 


                                 11/27/20 09:29





                                 11/27/20 08:31


Additional Labs: 


                                   Accuchecks











  11/27/20 11/26/20 11/26/20





  05:55 20:45 17:02


 


POC Glucose  144 H  228 H  183 H














Hospitalist ROS





- Review of Systems


Constitutional: denies: fever, chills, sweats, weakness, malaise, other


Eyes: denies: pain, vision change


ENT: denies: nose congestion, throat pain


Respiratory: denies: cough, shortness of breath


Cardiovascular: denies: chest pain, palpitations


Gastrointestinal: denies: nausea, vomiting, abdominal pain, diarrhea, melena, 

hematochezia


Genitourinary: denies: dysuria, hematuria


Musculoskeletal: reports: leg pain


Skin: reports: lesions


Neurological: denies: weakness, numbness





- Medication


Medications: 


Active Medications











Generic Name Dose Route Start Last Admin





  Trade Name Ryanq  PRN Reason Stop Dose Admin


 


Atorvastatin Calcium  80 mg  11/26/20 21:00  11/26/20 20:43





  Atorvastatin Calcium 40 Mg Tab  PO   80 mg





  HS DAYANNA   Administration


 


Gabapentin  200 mg  11/26/20 15:00  11/26/20 20:37





  Gabapentin 100 Mg Cap  PO   200 mg





  TID DAYANNA   Administration


 


Vancomycin HCl 750 mg/ Sodium  250 mls @ 250 mls/hr  11/27/20 03:00  11/27/20 

02:40





  Chloride  IVPB   250 mls





  0300 DAYANNA   Administration


 


Sodium Chloride  1,000 mls @ 75 mls/hr  11/26/20 13:15  11/26/20 22:40





  Normal Saline 0.9%  IV   1,000 mls





  .[id number] DAYANNA   Administration


 


Insulin Human Lispro  0 units  11/26/20 13:00  11/26/20 22:44





  Humalog 300 Units/3 Ml Vial  SC   2 unit





  .BEDTIME SLIDING SC PRN   Administration





  Bedtime Correctional Scale  


 


Mirtazapine  15 mg  11/26/20 21:00  11/26/20 20:43





  Mirtazapine 15 Mg Tab  PO   15 mg





  HS DAYANNA   Administration


 


Morphine Sulfate  2 mg  11/26/20 17:38  11/26/20 20:44





  Morphine 2 Mg/Ml Vial  SLOW IVP   2 mg





  Q4H PRN   Administration





  Moderate Pain (4-6)  


 


Ondansetron HCl  4 mg  11/26/20 17:39  11/26/20 20:44





  Ondansetron Odt 4 Mg Tab  PO   4 mg





  Q6H PRN   Administration





  Nausea/Vomiting  














- Exam


General Appearance: NAD, awake alert


Eye: PERRL, anicteric sclera


ENT: normocephalic atraumatic, no oropharyngeal lesions, moist mucosa


Neck: supple, symmetric, no JVD, no thyromegaly, no lymphadenopathy, no carotid 

bruit


Heart: RRR, no murmur, no gallops, no rubs, normal peripheral pulses


Respiratory: rales


Gastrointestinal: soft, non-tender, non-distended, normal bowel sounds, no 

palpable masses, no hepatomegaly, no splenomegaly, no bruit


Extremities - other findings: Left lower extremity with stable cellulitis.  

Peripheral pulses intact.


Skin - other findings: Wound to left lower extremity dressing in place clean dry

intact


Neurological: no weakness, no focal deficits


Musculoskeletal: normal tone, normal strength


Psychiatric: normal affect, normal behavior, A&O x 3





Hosp A/P





- Plan





Infected arterial ulcer


49-year-old female with severe peripheral vascular disease and chronic 

nonhealing arterial ulcer to left lower extremity presents with acute pain and 

worsening redness to surrounding skin. DP and PT pulses  dopperable, but 

monophasic. WBC 17.4, Lactic acid 2.1. Currently not meeting SIRS criteria. Conc

jair for osteomyelitis giving extent of wound.  Initial plain film showed no 

signs of bone abnormalities and question of air in subcutaneous tissues. Wound 

does have dry gangrene. Bone and tendon exposed. MRI showed no evidence of 

osteo, however limited by non-con study given pt's BRIANNA. General surgery 

consulted for ? nec fasc. Agree that air on XR was likely dt tracking from open 

wound not nec fasc. General surgery debrided the area and drained abscess. There

was reportedly plan for patient to start a wound VAC as an outpatient in the 

coming week. While a wound VAC may help to a small extent, likely patient needs 

vascular intervention to restore blood supply in order for wound to heal. CV christopher

gilberto consulted with plans for angiogram today, 11/27. 





Plan


Continue IV vancomycin, cefepime


Trend white blood cell count, fever curve, lactic acid


General surgery following


-CV surgery- angio today


-Wound care


-ID consult for abx guidance





Peripheral vascular disease


Significant peripheral vascular disease.  Patient had CTA aorta with runoff 

which showed near complete occlusion of bilateral iliacs, complete occlusion of 

left SFA with reconstitution.  Patient does have two-vessel runoff to left lower

extremity.  DP and PT pulses dopplerable but monophasic.  Patient with dependent

rubor.  Leg is warm.  Patient on aspirin and Plavix at home. Given extent of PVD

and severity of arterial wound, patient likely needs a BKA to salvage her leg 

and life. Discussed this with patient who is very resistant to amputation. CV 

surgery will attempt to restore blood flow, however prognosis is poor. 





Plan


Continue aspirin, Plavix


CV surgery with plans for angiogram 11/27





Acute kidney injury


On presentation BUN/CR 22/1.71.  Baseline creatinine less than 1.  Patient 

received 2 L of IV fluid in emergency room.  Likely prerenal. Cr now improved to

13/1.16.





Plan


Continue to monitor kidney function


Avoid nephrotoxic agents when possible


Renal dosing as appropriate





Hyperkalemia


Patient with elevated potassium to 5.8.  BUN/CR 22/1.71.  No EKG changes. 

Calcium to stabilize myocardium. Will repeat to confirm sample has not hemolyzed

and further treat if needed. Repeat potassium 4.2. Suspect initial sample was 

hemolyzed. Will continue to monitor. 





Plan


-Laboratory artifact


-Continue to monitor





Coronary artery disease


History of coronary artery disease status post CABG.  Patient on aspirin, 

Plavix, metoprolol, statin.  We will continue home medications.





Plan


Continue aspirin, statin


Continue Plavix, metoprolol





Type 2 diabetes mellitus


Type 2 diabetes on Metformin.  Will hold in setting of BRIANNA.  Will place patient 

on insulin sliding scale and ACHS glucose checks.





Plan


ISS


ACHS glucose checks


Hold home Metformin





Hyperlipidemia


Continue home statin.





Hepatitis C


Patient with history of hepatitis C infection.  LFTs within normal limits.  Does

not appear that patient has undergone treatment for this.  Patient will need 

outpatient follow-up with primary care for further management.





Anxiety/depression


Continue home Celexa.  Patient denies SI/HI.





DVT prophylaxis - heparin SQ


FULL CODE





Case discussed with attending physician, Dr. Douglass.

## 2020-11-27 NOTE — PDOC.GSPN
Surgery Progress Note: Subj





- Subjective


Narrative: 





Patient seen on morning rounds.  The wound care team had just change the 

wet-to-dry dressing on her leg.  She states that the dressing change was painful

but overall she feels like the pain in her leg is slightly better.  Since that 

time she has proceeded to angiography with Dr. Bueno.  He was able to open her 

iliacs but was unable to intervene on her SFA as it was quite small.  Her 

profunda is open.





Assessment/plan: Ischemic ulceration to the left leg with subsequent cellulitis.

 Vascular surgery has improved the blood flow to this area but was unable to 

open the superficial femoral artery so blood flow to the lower leg is still 

somewhat limited.  She may have the capacity to heal a below-knee amputation 

based on peripheral from her profunda, but currently she is still declining 

amputation.  We will continue with wound care and debridement at the bedside.





Surgery Progress Note: Obj





- Vital signs


Vital signs: 


                            Vital Signs - Most Recent











Temp Pulse Resp BP Pulse Ox


 


 98.5 F   71   18   153/66 H  96 


 


 11/27/20 08:15  11/27/20 08:15  11/27/20 08:15  11/27/20 08:15  11/27/20 08:15














Surgery Progress Note: Results





- Labs


Result Diagrams: 


                                 11/27/20 09:29





                                 11/27/20 08:31


Lab results: 


                         Laboratory Results - last 12 hr











  11/27/20 11/27/20 11/27/20





  05:55 08:31 09:29


 


WBC    15.3 H


 


RBC    4.02 L


 


Hgb    11.7 L


 


Hct    36.6


 


MCV    91.0


 


MCH    29.0


 


MCHC    31.9 L


 


RDW    14.3


 


Plt Count    342


 


MPV    7.1 L


 


Neutrophils %    82.3 H


 


Lymphocytes %    10.8 L


 


Monocytes %    6.0


 


Eosinophils %    0.5


 


Basophils %    0.3


 


Neutrophils #    12.6 H


 


Lymphocytes #    1.7


 


Monocytes #    0.9 H


 


Eosinophils #    0.1


 


Basophils #    0.0


 


Activated Clotting Time   


 


Sodium   136 


 


Potassium   4.3 


 


Chloride   109 H 


 


Carbon Dioxide   17 L 


 


Anion Gap   14 


 


BUN   13 


 


Creatinine   1.16 H 


 


Estimated GFR (MDRD)   49 


 


Glucose   144 H 


 


POC Glucose  144 H  


 


Calcium   7.8 














  11/27/20





  10:38


 


WBC 


 


RBC 


 


Hgb 


 


Hct 


 


MCV 


 


MCH 


 


MCHC 


 


RDW 


 


Plt Count 


 


MPV 


 


Neutrophils % 


 


Lymphocytes % 


 


Monocytes % 


 


Eosinophils % 


 


Basophils % 


 


Neutrophils # 


 


Lymphocytes # 


 


Monocytes # 


 


Eosinophils # 


 


Basophils # 


 


Activated Clotting Time  235


 


Sodium 


 


Potassium 


 


Chloride 


 


Carbon Dioxide 


 


Anion Gap 


 


BUN 


 


Creatinine 


 


Estimated GFR (MDRD) 


 


Glucose 


 


POC Glucose 


 


Calcium

## 2020-11-27 NOTE — OP
DATE OF PROCEDURE:  11/27/2020



PREOPERATIVE DIAGNOSIS:  Peripheral vascular disease with gangrene on left lower

extremity. 



POSTOPERATIVE DIAGNOSIS:  Peripheral vascular disease with gangrene on left lower

extremity. 



PROCEDURES PERFORMED:  

1. Ultrasound-guided left common femoral artery access.

2. Left external iliac artery angiogram.

3. Abdominal aorta angiogram.

4. Percutaneous transluminal angioplasty of the left common and external iliac

arteries with a 5 x 60 [place] balloon, taken to 8 mmHg for 2 minutes. 

5. Stenting of the left common and external iliac with a 6 x 60 Innova

self-expanding stent. 



TOTAL CONTRAST:  25 mL.



TOTAL FLUORO TIME:  2.5 minutes.



HEPARIN:  3000 units.



PROTAMINE:  20 mg at completion.



DESCRIPTION OF PROCEDURE:  After consent was obtained, the patient was brought to

the cath lab and placed in supine position on the cath lab table.  Appropriate

central line and monitors were placed.  Groins were prepped and draped in the usual

sterile fashion.  Using ultrasound guidance, the left groin was anesthetized with 1%

lidocaine.  Using ultrasound guidance, micropuncture sheath was placed in the left

common femoral artery.  This was exchanged for a 5-Swazi sheath over a Kaleio

wire.  Hand-injected arteriogram was performed with the tip of the sheath in the

external iliac artery.  The external iliac artery was occluded, but the wire had

easily passed upstream.  A Contra catheter was passed over the wire in the abdominal

aorta.  Aortogram was performed, showing critical stenosis of the common iliac

artery with an occlusion of the external iliac artery just distal to the

bifurcation.  The patient was given 3000 units of heparin.  A 5 x 60 [place] balloon

was selected and passed to the aortic bifurcation.  This was inflated and held for 2

minutes.  Followup angiogram showed an excellent result.  There was some residual

stenosis, but excellent flow.  We elected to stent with a 6 x 60 Innova stent, which

was deployed.  The followup angiogram showed well-deployed stent with good

apposition throughout its length.  There was good flow through the stent.  20 mg of

protamine had been given.  Sheath was removed and manual pressure held for

hemostasis.  The patient tolerated the procedure well and was transferred back to

the floor in good condition. 







Job ID:  [id number]

## 2020-11-27 NOTE — RAD
EXAM: Single view of the chest



HISTORY:   Shortness of breath



COMPARISON: 10/17/2019



FINDINGS: Single view of the chest shows a normal sized cardiomediastinal silhouette.  The patient is
 status post sternotomy.  There appear to be fluffy bilateral perihilar opacities. No pleural

effusion is seen. There is a healed right clavicle fracture. Degenerative changes are seen in the spi
ne.



IMPRESSION: Bilateral perihilar opacities



Reported By: Mik Benavidez 

Electronically Signed:  11/27/2020 2:33 PM

## 2020-11-28 LAB
ANION GAP SERPL CALC-SCNC: 14 MMOL/L (ref 10–20)
BASOPHILS # BLD AUTO: 0 THOU/UL (ref 0–0.2)
BASOPHILS NFR BLD AUTO: 0.2 % (ref 0–1)
BUN SERPL-MCNC: 12 MG/DL (ref 9.8–20.1)
CALCIUM SERPL-MCNC: 7.8 MG/DL (ref 7.8–10.44)
CHLORIDE SERPL-SCNC: 108 MMOL/L (ref 98–107)
CO2 SERPL-SCNC: 19 MMOL/L (ref 22–29)
CREAT CL PREDICTED SERPL C-G-VRATE: 57 ML/MIN (ref 70–130)
EOSINOPHIL # BLD AUTO: 0.1 THOU/UL (ref 0–0.7)
EOSINOPHIL NFR BLD AUTO: 0.4 % (ref 0–10)
GLUCOSE SERPL-MCNC: 191 MG/DL (ref 70–105)
HGB BLD-MCNC: 11 G/DL (ref 12–16)
LYMPHOCYTES # BLD: 1.7 THOU/UL (ref 1.2–3.4)
LYMPHOCYTES NFR BLD AUTO: 12.5 % (ref 21–51)
MCH RBC QN AUTO: 29.2 PG (ref 27–31)
MCV RBC AUTO: 90.8 FL (ref 78–98)
MONOCYTES # BLD AUTO: 0.9 THOU/UL (ref 0.11–0.59)
MONOCYTES NFR BLD AUTO: 6.9 % (ref 0–10)
NEUTROPHILS # BLD AUTO: 10.9 THOU/UL (ref 1.4–6.5)
NEUTROPHILS NFR BLD AUTO: 80 % (ref 42–75)
PLATELET # BLD AUTO: 300 THOU/UL (ref 130–400)
POTASSIUM SERPL-SCNC: 3.9 MMOL/L (ref 3.5–5.1)
RBC # BLD AUTO: 3.74 MILL/UL (ref 4.2–5.4)
SODIUM SERPL-SCNC: 137 MMOL/L (ref 136–145)
VANCOMYCIN TROUGH SERPL-MCNC: 10.5 UG/ML
WBC # BLD AUTO: 13.7 THOU/UL (ref 4.8–10.8)

## 2020-11-28 RX ADMIN — VANCOMYCIN HYDROCHLORIDE SCH MLS: 1 INJECTION, SOLUTION INTRAVENOUS at 03:36

## 2020-11-28 RX ADMIN — INSULIN LISPRO PRN UNIT: 100 INJECTION, SOLUTION INTRAVENOUS; SUBCUTANEOUS at 17:26

## 2020-11-28 RX ADMIN — HEPARIN SODIUM SCH UNITS: 5000 INJECTION, SOLUTION INTRAVENOUS; SUBCUTANEOUS at 20:22

## 2020-11-28 RX ADMIN — Medication SCH ML: at 20:22

## 2020-11-28 RX ADMIN — VANCOMYCIN HYDROCHLORIDE SCH: 750 INJECTION, POWDER, LYOPHILIZED, FOR SOLUTION INTRAVENOUS at 03:43

## 2020-11-28 RX ADMIN — Medication SCH ML: at 09:51

## 2020-11-28 RX ADMIN — INSULIN LISPRO PRN UNIT: 100 INJECTION, SOLUTION INTRAVENOUS; SUBCUTANEOUS at 06:09

## 2020-11-28 NOTE — PDOC.GSPN
Surgery Progress Note: Subj





- Subjective


Narrative: 





Patient seen with wound care team this morning.  The wound is debriding well 

with wet-to-dry dressings and the underlying tissues appear viable.  The 

necrotic desiccated tendon has been excised and there are some other exposed 

tendons which appear viable at this point.  The patient continues to decline 

below-knee amputation.  We will continue with wet-to-dry dressing changes until 

the wound is adequately debrided following which we will transition to a VAC 

dressing.





Surgery Progress Note: Obj





- Vital signs


Vital signs: 


                            Vital Signs - Most Recent











Temp Pulse Resp BP Pulse Ox


 


 98.0 F   75   16   142/70 H  98 


 


 11/28/20 11:45  11/28/20 11:45  11/28/20 11:45  11/28/20 11:45  11/28/20 11:45














Surgery Progress Note: Results





- Labs


Result Diagrams: 


                                 11/28/20 04:09





                                 11/28/20 04:09


Lab results: 


                         Laboratory Results - last 12 hr











  11/28/20 11/28/20 11/28/20





  02:21 04:09 04:09


 


WBC    13.7 H


 


RBC    3.74 L


 


Hgb    11.0 L


 


Hct    34.0 L


 


MCV    90.8


 


MCH    29.2


 


MCHC    32.2


 


RDW    14.5


 


Plt Count    300


 


MPV    7.2 L


 


Neutrophils %    80.0 H


 


Lymphocytes %    12.5 L


 


Monocytes %    6.9


 


Eosinophils %    0.4


 


Basophils %    0.2


 


Neutrophils #    10.9 H


 


Lymphocytes #    1.7


 


Monocytes #    0.9 H


 


Eosinophils #    0.1


 


Basophils #    0.0


 


Sodium   137 


 


Potassium   3.9 


 


Chloride   108 H 


 


Carbon Dioxide   19 L 


 


Anion Gap   14 


 


BUN   12 


 


Creatinine   1.11 H 


 


Estimated GFR (MDRD)   52 


 


Glucose   191 H 


 


POC Glucose   


 


Calcium   7.8 


 


B-Natriuretic Peptide   


 


Vancomycin Trough  10.5  














  11/28/20 11/28/20 11/28/20





  05:44 10:16 11:03


 


WBC   


 


RBC   


 


Hgb   


 


Hct   


 


MCV   


 


MCH   


 


MCHC   


 


RDW   


 


Plt Count   


 


MPV   


 


Neutrophils %   


 


Lymphocytes %   


 


Monocytes %   


 


Eosinophils %   


 


Basophils %   


 


Neutrophils #   


 


Lymphocytes #   


 


Monocytes #   


 


Eosinophils #   


 


Basophils #   


 


Sodium   


 


Potassium   


 


Chloride   


 


Carbon Dioxide   


 


Anion Gap   


 


BUN   


 


Creatinine   


 


Estimated GFR (MDRD)   


 


Glucose   


 


POC Glucose  200 H   131 H


 


Calcium   


 


B-Natriuretic Peptide   1307.6 H 


 


Vancomycin Trough

## 2020-11-28 NOTE — PDOC.HOSPP
- Subjective


Encounter Date: 11/28/20


Encounter Time: 10:05


Subjective: 


No overnight events. Patient with new oxygen requirement of 2L NC. Denies 

shortness of breath. Patient is not on any inhalers at home and has no history 

of COPD/asthma. 





Today patient reports the pain in her L leg is well managed. Deneis 

fever/chills/nightsweats. Patient fearful of needing amputation. 





Chart and medications reviewed. 





- Objective


Vital Signs & Weight: 


                             Vital Signs (12 hours)











  Temp Pulse Resp BP Pulse Ox


 


 11/28/20 07:35  97.7 F  77  16  150/68 H  96


 


 11/28/20 06:00     163/67 H 


 


 11/28/20 04:00  97.9 F  85  16  175/80 H  96


 


 11/28/20 00:30  98.5 F  73  14  151/68 H  94 L


 


 11/27/20 22:21      2 L








                                     Weight











Admit Weight                   129 lb


 


Weight                         132 lb 6.4 oz














I&O: 


                                        











 11/27/20 11/28/20 11/29/20





 06:59 06:59 06:59


 


Intake Total 2275  


 


Output Total 225  


 


Balance 2050  











Result Diagrams: 


                                 11/28/20 04:09





                                 11/28/20 04:09


Additional Labs: 


                                   Accuchecks











  11/28/20 11/27/20 11/27/20





  05:44 20:30 16:17


 


POC Glucose  200 H  143 H  214 H














Hospitalist ROS





- Review of Systems


Constitutional: denies: fever, chills, sweats, weakness, malaise, other


Eyes: denies: pain, vision change, conjunctivae inflammation, eyelid 

inflammation, redness, other


ENT: denies: ear pain, ear discharge, nose pain, nose discharge, nose 

congestion, mouth pain, mouth swelling, throat pain, throat swelling, other


Respiratory: denies: cough, dry, shortness of breath, hemoptysis, SOB with 

excertion, pleuritic pain, sputum, wheezing, other


Cardiovascular: denies: chest pain, palpitations, orthopnea, paroxysmal noc. 

dyspnea, edema, light headedness, other


Gastrointestinal: denies: nausea, vomiting, abdominal pain, diarrhea, 

constipation, melena, hematochezia, other


Genitourinary: denies: dysuria, frequency, incontinence, hematuria, retention, 

other


Musculoskeletal: reports: leg pain.  denies: neck pain, shoulder pain, arm pain,

back pain, hand pain, foot pain, other


Skin: reports: rash, lesions


Neurological: denies: weakness, numbness, incoordination, change in speech, 

confusion, seizures, other





- Medication


Medications: 


Active Medications











Generic Name Dose Route Start Last Admin





  Trade Name Freq  PRN Reason Stop Dose Admin


 


Atorvastatin Calcium  80 mg  11/26/20 21:00  11/27/20 20:11





  Atorvastatin Calcium 40 Mg Tab  PO   80 mg





  HS DAYANNA   Administration


 


Citalopram Hydrobromide  40 mg  11/27/20 09:00  11/28/20 09:50





  Citalopram 20 Mg Tab  PO   40 mg





  DAILY DAYANNA   Administration


 


Clopidogrel Bisulfate  75 mg  11/27/20 09:00  11/28/20 09:49





  Clopidogrel Bisulfate 75 Mg Tab  PO   75 mg





  QAM DAYANNA   Administration


 


Gabapentin  200 mg  11/26/20 15:00  11/28/20 09:49





  Gabapentin 100 Mg Cap  PO   200 mg





  TID DAYANNA   Administration


 


Sodium Chloride  1,000 mls @ 75 mls/hr  11/26/20 13:15  11/28/20 09:52





  Normal Saline 0.9%  IV   1,000 mls





  .[id number] DAYANNA   Administration


 


Vancomycin HCl 1 gm/ Device  200 mls @ 200 mls/hr  11/28/20 04:00  11/28/20 

03:36





  IVPB   200 mls





  0400 DAYANNA   Administration


 


Cefepime HCl 2 gm/ Sodium  100 mls @ 200 mls/hr  11/28/20 09:00  11/28/20 09:50





  Chloride  IVPB   100 mls





  Q12HR DAYANNA   Administration


 


Insulin Human Lispro  0 units  11/26/20 13:00  11/28/20 06:09





  Humalog 300 Units/3 Ml Vial  SC   2 unit





  .MILD SLIDING SCALE PRN   Administration





  Mild Correctional Scale  


 


Insulin Human Lispro  0 units  11/26/20 13:00  11/26/20 22:44





  Humalog 300 Units/3 Ml Vial  SC   2 unit





  .BEDTIME SLIDING SC PRN   Administration





  Bedtime Correctional Scale  


 


Metoprolol Tartrate  25 mg  11/27/20 09:00  11/28/20 09:49





  Metoprolol Tartrate 25 Mg Tab  PO   25 mg





  DAILY DAYANNA   Administration


 


Mirtazapine  15 mg  11/26/20 21:00  11/27/20 20:11





  Mirtazapine 15 Mg Tab  PO   15 mg





  HS DAYANNA   Administration


 


Morphine Sulfate  2 mg  11/26/20 17:38  11/28/20 09:50





  Morphine 2 Mg/Ml Vial  SLOW IVP   2 mg





  Q4H PRN   Administration





  Moderate Pain (4-6)  


 


Ondansetron HCl  4 mg  11/26/20 17:39  11/27/20 09:30





  Ondansetron Odt 4 Mg Tab  PO   4 mg





  Q6H PRN   Administration





  Nausea/Vomiting  


 


Saccharomyces Boulardii  250 mg  11/27/20 09:00  11/28/20 09:50





  Saccharomyces Boulardii 250 Mg Cap  PO   250 mg





  DAILY DAYANNA   Administration


 


Sodium Chloride  10 ml  11/28/20 09:00  11/28/20 09:51





  Flush - Normal Saline 10 Ml Syringe  IVF   10 ml





  Q12HR DAYANNA   Administration














- Exam


General Appearance: NAD, awake alert


Eye: PERRL, anicteric sclera


ENT: normocephalic atraumatic, no oropharyngeal lesions, moist mucosa


Neck: supple, symmetric, no JVD, no thyromegaly, no lymphadenopathy, no carotid 

bruit


Heart: RRR, no murmur, no gallops, no rubs, normal peripheral pulses


Respiratory - other findings: Crackles at bases


Gastrointestinal: soft, non-tender, non-distended, normal bowel sounds, no 

palpable masses, no hepatomegaly, no splenomegaly, no bruit


Extremities - other findings: Dressing C/D/I


Skin - other findings: sensation intact


Neurological: normal sensation to touch, no weakness, no focal deficits


Musculoskeletal: normal tone, diffuse muscle atrophy


Psychiatric: normal affect, normal behavior, A&O x 3





Hosp A/P





- Plan





Infected arterial ulcer


49-year-old female with severe peripheral vascular disease and chronic 

nonhealing arterial ulcer to left lower extremity presents with acute pain and 

worsening redness to surrounding skin. DP and PT pulses  dopperable, but 

monophasic. WBC 17.4, Lactic acid 2.1. Currently not meeting SIRS criteria. 

Concern for osteomyelitis giving extent of wound.  Initial plain film showed no 

signs of bone abnormalities and question of air in subcutaneous tissues. Wound 

does have dry gangrene. Bone and tendon exposed. MRI showed no evidence of 

osteo, however limited by non-con study given pt's BRIANNA. General surgery 

consulted for ? nec fasc. Agree that air on XR was likely dt tracking from open 

wound not nec fasc. General surgery debrided the area and drained abscess. CV 

surgery performed angiogram on 11/27 and was able to open the L iliac, but 

unable to restore blood flow to calf. Wound cultures growing serratia and 

enterococus. Blood cx NGTd. ID consulted for further abx recommendations. 

Continues on vanc and cefepime currently. 





Plan


Continue IV vancomycin, cefepime


Trend white blood cell count, fever curve, lactic acid


General surgery following


-s/p angio with stent to L iliac


-Wound care


-ID consult for abx guidance





Peripheral vascular disease


Significant peripheral vascular disease.  Patient had CTA aorta with runoff 

which showed near complete occlusion of bilateral iliacs, complete occlusion of 

left SFA with reconstitution.  Patient does have two-vessel runoff to left lower

extremity.  DP and PT pulses dopplerable but monophasic.  Patient with dependent

rubor.  Leg is warm.  Patient on aspirin and Plavix at home. Given extent of PVD

and severity of arterial wound, patient likely needs a BKA to salvage her leg 

and life. Discussed this with patient who is very resistant to amputation. CV 

surgery placed a stent to L iliac, however unable to restore blood flow to calf.

Prognosis is poor. General surgery feels that patient would likely be able to 

heal a BKA since iliac opened, but patient continues to refuse. Discussed at 

length with patient the general post-operative course after a BKA. She would 

like to continue with conservative management for now. I encouraged her to 

discuss with her family. 





Plan


Continue aspirin, Plavix


s/p angio with iliac stent


-Needs BKA, but patient refusing


-Continue conservative management for now


-General surgery following








New oxygen requirement


Patient with new O2 requirement on 2L NC. Crackles heard at bases. CXR showed 

fluffy hilar opacities. Likely component of CHF given patient's CABG history. 

Last echo was more than 1 year ago and pt had normal EF at that time. Will 

repeat echo, obtain BNP.





Plan


-Repeat echocardiogram


-Closely monitor respiratory status


-Husam prn


-BNP





Acute kidney injury


On presentation BUN/CR 22/1.71.  Baseline creatinine less than 1.  Patient 

received 2 L of IV fluid in emergency room.  Likely prerenal. Cr now improved to

13/1.16.





Plan


Continue to monitor kidney function


Avoid nephrotoxic agents when possible


Renal dosing as appropriate





Hyperkalemia


Patient with elevated potassium to 5.8.  BUN/CR 22/1.71.  No EKG changes. 

Calcium to stabilize myocardium. Will repeat to confirm sample has not hemolyzed

and further treat if needed. Repeat potassium 4.2. Suspect initial sample was 

hemolyzed. Will continue to monitor. 





Plan


-Laboratory artifact


-Continue to monitor





Coronary artery disease


History of coronary artery disease status post CABG.  Patient on aspirin, 

Plavix, metoprolol, statin.  We will continue home medications.





Plan


Continue aspirin, statin


Continue Plavix, metoprolol





Type 2 diabetes mellitus


Type 2 diabetes on Metformin.  Will hold in setting of BRIANNA.  Will place patient 

on insulin sliding scale and ACHS glucose checks.





Plan


ISS


ACHS glucose checks


Hold home Metformin





Hyperlipidemia


Continue home statin.





Hepatitis C


Patient with history of hepatitis C infection.  LFTs within normal limits.  Does

not appear that patient has undergone treatment for this.  Patient will need 

outpatient follow-up with primary care for further management.





Anxiety/depression


Continue home Celexa.  Patient denies SI/HI.





DVT prophylaxis - heparin SQ


FULL CODE





Case discussed with attending physician, Dr. Douglass.

## 2020-11-29 LAB
ANION GAP SERPL CALC-SCNC: 11 MMOL/L (ref 10–20)
BASOPHILS # BLD AUTO: 0.1 THOU/UL (ref 0–0.2)
BASOPHILS NFR BLD AUTO: 0.5 % (ref 0–1)
BUN SERPL-MCNC: 14 MG/DL (ref 9.8–20.1)
CALCIUM SERPL-MCNC: 7.7 MG/DL (ref 7.8–10.44)
CHLORIDE SERPL-SCNC: 103 MMOL/L (ref 98–107)
CO2 SERPL-SCNC: 22 MMOL/L (ref 22–29)
CREAT CL PREDICTED SERPL C-G-VRATE: 52 ML/MIN (ref 70–130)
EOSINOPHIL # BLD AUTO: 0.1 THOU/UL (ref 0–0.7)
EOSINOPHIL NFR BLD AUTO: 0.8 % (ref 0–10)
GLUCOSE SERPL-MCNC: 254 MG/DL (ref 70–105)
HGB BLD-MCNC: 10 G/DL (ref 12–16)
LYMPHOCYTES # BLD: 1.2 THOU/UL (ref 1.2–3.4)
LYMPHOCYTES NFR BLD AUTO: 10.7 % (ref 21–51)
MCH RBC QN AUTO: 28.6 PG (ref 27–31)
MCV RBC AUTO: 89.7 FL (ref 78–98)
MONOCYTES # BLD AUTO: 1 THOU/UL (ref 0.11–0.59)
MONOCYTES NFR BLD AUTO: 8.9 % (ref 0–10)
NEUTROPHILS # BLD AUTO: 8.7 THOU/UL (ref 1.4–6.5)
NEUTROPHILS NFR BLD AUTO: 79.2 % (ref 42–75)
PLATELET # BLD AUTO: 268 THOU/UL (ref 130–400)
POTASSIUM SERPL-SCNC: 3.7 MMOL/L (ref 3.5–5.1)
RBC # BLD AUTO: 3.48 MILL/UL (ref 4.2–5.4)
SODIUM SERPL-SCNC: 132 MMOL/L (ref 136–145)
WBC # BLD AUTO: 11 THOU/UL (ref 4.8–10.8)

## 2020-11-29 RX ADMIN — Medication SCH ML: at 08:25

## 2020-11-29 RX ADMIN — INSULIN LISPRO PRN UNIT: 100 INJECTION, SOLUTION INTRAVENOUS; SUBCUTANEOUS at 05:39

## 2020-11-29 RX ADMIN — Medication SCH ML: at 21:00

## 2020-11-29 RX ADMIN — VANCOMYCIN HYDROCHLORIDE SCH MLS: 1 INJECTION, SOLUTION INTRAVENOUS at 03:10

## 2020-11-29 RX ADMIN — HEPARIN SODIUM SCH UNITS: 5000 INJECTION, SOLUTION INTRAVENOUS; SUBCUTANEOUS at 20:50

## 2020-11-29 RX ADMIN — HEPARIN SODIUM SCH UNITS: 5000 INJECTION, SOLUTION INTRAVENOUS; SUBCUTANEOUS at 08:26

## 2020-11-29 RX ADMIN — INSULIN LISPRO PRN UNIT: 100 INJECTION, SOLUTION INTRAVENOUS; SUBCUTANEOUS at 11:43

## 2020-11-29 RX ADMIN — INSULIN LISPRO PRN UNIT: 100 INJECTION, SOLUTION INTRAVENOUS; SUBCUTANEOUS at 20:57

## 2020-11-29 RX ADMIN — INSULIN LISPRO PRN UNIT: 100 INJECTION, SOLUTION INTRAVENOUS; SUBCUTANEOUS at 17:17

## 2020-11-29 RX ADMIN — SULFAMETHOXAZOLE AND TRIMETHOPRIM SCH MLS: 80; 16 INJECTION, SOLUTION, CONCENTRATE INTRAVENOUS at 20:54

## 2020-11-29 NOTE — OP
DATE OF PROCEDURE:  11/26/2020



PROCEDURE PERFORMED:  Debridement of the left leg.



DESCRIPTION OF PROCEDURE:  After informed consent was obtained and appropriate

antibiotics continued, the left leg was prepped with Betadine and sterilely 
draped.

The eschar overlying the superolateral portion of the wound was sharply debrided
and

an abscess encountered and drained.  This was sent for Gram stain and culture.  
Some

of the necrotic subcutaneous tissue was debrided as well as some of the necrotic
tendon.

 Some of the eschar along the inferior part of the wound was also sharply 
excised,

but no additional abscess noted at this point.  There was no pus tracking along 
the

exposed tendon in this area and no significant tunneling beyond the borders of 
the

eschar.  The wound was packed with normal saline soaked gauze, covered with dry

gauze and secured with Kerlix.  The patient tolerated the procedure well.

Approximately 15 sq cm of necrotic skin and subcutaneous tissue were excised. 



SPECIMEN:  Pus for Gram stain and culture.







Job ID:  [id number]



MTDD

## 2020-11-29 NOTE — PDOC.GSPN
Surgery Progress Note: Subj





- Subjective


Narrative: 





Wound is looking .  Some superficial sloughing tissue was removed and a 

VAC dressing was placed.  Patient continues to decline amputation.  Will see 

with wound care team on Tuesday.  Patient does have a home wound VAC which was 

just delivered.





Surgery Progress Note: Obj





- Vital signs


Vital signs: 


                            Vital Signs - Most Recent











Temp Pulse Resp BP Pulse Ox


 


 98.3 F   80   15   156/67 H  97 


 


 11/29/20 11:49  11/29/20 11:49  11/29/20 11:49  11/29/20 11:49  11/29/20 11:49














Surgery Progress Note: Results





- Labs


Result Diagrams: 


                                 11/29/20 04:43





                                 11/29/20 04:43


Lab results: 


                         Laboratory Results - last 12 hr











  11/29/20 11/29/20 11/29/20





  04:43 04:43 05:22


 


WBC   11.0 H 


 


RBC   3.48 L 


 


Hgb   10.0 L 


 


Hct   31.2 L 


 


MCV   89.7 


 


MCH   28.6 


 


MCHC   31.9 L 


 


RDW   14.3 


 


Plt Count   268 


 


MPV   6.8 L 


 


Neutrophils %   79.2 H 


 


Lymphocytes %   10.7 L 


 


Monocytes %   8.9 


 


Eosinophils %   0.8 


 


Basophils %   0.5 


 


Neutrophils #   8.7 H 


 


Lymphocytes #   1.2 


 


Monocytes #   1.0 H 


 


Eosinophils #   0.1 


 


Basophils #   0.1 


 


Sodium  132 L  


 


Potassium  3.7  


 


Chloride  103  


 


Carbon Dioxide  22  


 


Anion Gap  11  


 


BUN  14  


 


Creatinine  1.21 H  


 


Estimated GFR (MDRD)  47  


 


Glucose  254 H  


 


POC Glucose    264 H


 


Calcium  7.7 L  














  11/29/20





  11:12


 


WBC 


 


RBC 


 


Hgb 


 


Hct 


 


MCV 


 


MCH 


 


MCHC 


 


RDW 


 


Plt Count 


 


MPV 


 


Neutrophils % 


 


Lymphocytes % 


 


Monocytes % 


 


Eosinophils % 


 


Basophils % 


 


Neutrophils # 


 


Lymphocytes # 


 


Monocytes # 


 


Eosinophils # 


 


Basophils # 


 


Sodium 


 


Potassium 


 


Chloride 


 


Carbon Dioxide 


 


Anion Gap 


 


BUN 


 


Creatinine 


 


Estimated GFR (MDRD) 


 


Glucose 


 


POC Glucose  276 H


 


Calcium

## 2020-11-29 NOTE — PDOC.HOSPP
- Subjective


Encounter Date: 11/29/20


Encounter Time: 11:15


Subjective: 


No overnight events. Patient reports her breathing feels much improved. Still on

2L O2 NC. Denies chest pain. Deneis weakness, numbness. 





Chart and medications reviewed. 





- Objective


Vital Signs & Weight: 


                             Vital Signs (12 hours)











  Temp Pulse Resp BP Pulse Ox


 


 11/29/20 08:16  98.2 F  85  18  135/61  98


 


 11/29/20 07:46   82  13  


 


 11/29/20 03:09  99.6 F  80  16  127/62  98


 


 11/29/20 01:48   80  16   100


 


 11/29/20 01:32      99


 


 11/29/20 00:00   86   130/72 








                                     Weight











Admit Weight                   129 lb


 


Weight                         131 lb 6.4 oz














I&O: 


                                        











 11/28/20 11/29/20 11/30/20





 06:59 06:59 06:59


 


Intake Total  450 


 


Output Total  500 


 


Balance  -50 











Result Diagrams: 


                                 12/01/20 04:09





                                 12/01/20 04:09


Additional Labs: 


                                   Accuchecks











  11/29/20 11/28/20 11/28/20





  05:22 20:32 16:43


 


POC Glucose  264 H  205 H  273 H














Hospitalist ROS





- Review of Systems


Constitutional: denies: fever, chills, sweats, weakness


Eyes: denies: vision change


Respiratory: denies: cough, shortness of breath, hemoptysis, sputum


Cardiovascular: denies: chest pain, palpitations, orthopnea, light headedness


Gastrointestinal: denies: nausea, vomiting, abdominal pain


Genitourinary: denies: dysuria


Musculoskeletal: reports: leg pain


Skin: reports: lesions


Neurological: denies: weakness, numbness





- Medication


Medications: 


Active Medications











Generic Name Dose Route Start Last Admin





  Trade Name Freq  PRN Reason Stop Dose Admin


 


Albuterol/Ipratropium  3 ml  11/28/20 10:30  11/29/20 07:46





  Ipratropium/Albuterol Sulfate 3 Ml Neb  NEB   3 ml





  [id number]-DE DAYANNA   Administration


 


Atorvastatin Calcium  80 mg  11/26/20 21:00  11/28/20 20:21





  Atorvastatin Calcium 40 Mg Tab  PO   80 mg





  HS DAYANNA   Administration


 


Citalopram Hydrobromide  40 mg  11/27/20 09:00  11/29/20 08:24





  Citalopram 20 Mg Tab  PO   40 mg





  DAILY DAYANNA   Administration


 


Clopidogrel Bisulfate  75 mg  11/27/20 09:00  11/29/20 08:24





  Clopidogrel Bisulfate 75 Mg Tab  PO   75 mg





  QAM DAYANNA   Administration


 


Gabapentin  200 mg  11/26/20 15:00  11/29/20 08:24





  Gabapentin 100 Mg Cap  PO   200 mg





  TID DAYANNA   Administration


 


Heparin Sodium (Porcine)  5,000 units  11/28/20 21:00  11/29/20 08:26





  Heparin 5,000 Units/Ml Vial  SC   5,000 units





  BID DAYANNA   Administration


 


Vancomycin HCl 1 gm/ Device  200 mls @ 200 mls/hr  11/28/20 04:00  11/29/20 

03:10





  IVPB   200 mls





  0400 DAYANNA   Administration


 


Cefepime HCl 2 gm/ Sodium  100 mls @ 200 mls/hr  11/28/20 09:00  11/29/20 08:23





  Chloride  IVPB   100 mls





  Q12HR DAYANNA   Administration


 


Insulin Human Lispro  0 units  11/26/20 13:00  11/29/20 05:39





  Humalog 300 Units/3 Ml Vial  SC   4 unit





  .MILD SLIDING SCALE PRN   Administration





  Mild Correctional Scale  


 


Insulin Human Lispro  0 units  11/26/20 13:00  11/26/20 22:44





  Humalog 300 Units/3 Ml Vial  SC   2 unit





  .BEDTIME SLIDING SC PRN   Administration





  Bedtime Correctional Scale  


 


Metoprolol Tartrate  25 mg  11/27/20 09:00  11/29/20 08:25





  Metoprolol Tartrate 25 Mg Tab  PO   25 mg





  DAILY DAYANNA   Administration


 


Mirtazapine  15 mg  11/26/20 21:00  11/28/20 20:22





  Mirtazapine 15 Mg Tab  PO   15 mg





  HS DAYANNA   Administration


 


Morphine Sulfate  2 mg  11/26/20 17:38  11/29/20 08:25





  Morphine 2 Mg/Ml Vial  SLOW IVP   2 mg





  Q4H PRN   Administration





  Moderate Pain (4-6)  


 


Ondansetron HCl  4 mg  11/26/20 17:39  11/27/20 09:30





  Ondansetron Odt 4 Mg Tab  PO   4 mg





  Q6H PRN   Administration





  Nausea/Vomiting  


 


Saccharomyces Boulardii  250 mg  11/27/20 09:00  11/29/20 08:25





  Saccharomyces Boulardii 250 Mg Cap  PO   250 mg





  DAILY DAYANNA   Administration


 


Sodium Chloride  10 ml  11/28/20 09:00  11/29/20 08:25





  Flush - Normal Saline 10 Ml Syringe  IVF   10 ml





  Q12HR DAYANNA   Administration














- Exam


General Appearance: NAD, awake alert


Eye: PERRL, anicteric sclera


ENT: normocephalic atraumatic, no oropharyngeal lesions, moist mucosa


Neck: supple, symmetric, no JVD, no thyromegaly, no lymphadenopathy, no carotid 

bruit


Heart: RRR, no murmur, no gallops, no rubs, normal peripheral pulses


Respiratory: CTAB, no wheezes, no rales, no ronchi, normal chest expansion, no 

tachypnea, normal percussion


Gastrointestinal: soft, non-tender, non-distended, normal bowel sounds, no 

palpable masses, no hepatomegaly, no splenomegaly, no bruit


Extremities - other findings: Dressing C/D/I LLE


Neurological: cranial nerve grossly intact, normal sensation to touch, no 

weakness, no focal deficits, no new deficit


Musculoskeletal: normal tone, normal strength, no muscle wasting


Psychiatric: normal affect, normal behavior, A&O x 3





Hosp A/P





- Plan





Infected arterial ulcer


49-year-old female with severe peripheral vascular disease and chronic 

nonhealing arterial ulcer to left lower extremity presents with acute pain and 

worsening redness to surrounding skin. DP and PT pulses  dopperable, but 

monophasic. WBC 17.4, Lactic acid 2.1. Currently not meeting SIRS criteria. 

Concern for osteomyelitis giving extent of wound.  Initial plain film showed no 

signs of bone abnormalities and question of air in subcutaneous tissues. Wound 

does have dry gangrene. Bone and tendon exposed. MRI showed no evidence of 

osteo. General surgery consulted for ? nec fasc. Agree that air on XR was likely

dt tracking from open wound not nec fasc. General surgery debrided the area and 

drained abscess. CV surgery performed angiogram on 11/27 and was able to open 

the L iliac, but unable to restore blood flow to calf. Wound cultures growing 

serratia, enterococcus, and stenotrophomonas maltophilia which was sensitive to 

bactrim. Will add bactrim. Blood cx NGTd. ID consulted for further abx 

recommendations. WBC improving to 11 on 11/29. Continues on vanc and cefepime 

currently, add bactrim. 





Plan


Continue IV vancomycin, cefepime


-Add bactrim for stenotrophomonas maltophilia coverage


-s/p angio with stent to L iliac


-Wound care, will need wound vac set up at home


-ID consult for abx guidance





Peripheral vascular disease


Significant peripheral vascular disease.  Patient had CTA aorta with runoff 

which showed near complete occlusion of bilateral iliacs, complete occlusion of 

left SFA with reconstitution.  Patient does have two-vessel runoff to left lower

extremity.  DP and PT pulses dopplerable but monophasic.  Patient with dependent

rubor.  Leg is warm.  Patient on aspirin and Plavix at home. Given extent of PVD

and severity of arterial wound, patient likely needs a BKA to salvage her leg 

and life. Discussed this with patient who is very resistant to amputation. CV 

surgery placed a stent to L iliac, however unable to restore blood flow to calf.

Prognosis is poor. General surgery feels that patient would likely be able to 

heal a BKA since iliac opened, but patient continues to refuse. Discussed at 

length with patient the general post-operative course after a BKA. She would 

like to continue with conservative management for now. I encouraged her to 

discuss with her family. 





Plan


Continue aspirin, Plavix


s/p angio with iliac stent


-Needs BKA, but patient refusing


-Continue conservative management for now


-General surgery following








Congestive Heart Failure


Patient with new O2 requirement on 2L NC on 11/28. Crackles heard at bases. CXR 

showed fluffy hilar opacities. Likely component of CHF given patient's CABG 

history. Last echo was more than 1 year ago and pt had normal EF at that time. 

BNP elevated at 1307. Repeat echo showed EF of 55-60% with diastolic 

dysfunction. Received one dose of lasix with improvement in her breathing. 

Crackles resolved on exam 11/29. Will attempt to wean patient off oxygen. 





Plan


-Echo showed diastolic dysfunction


-Improved after dose of lasix


-Will attempt to wean off O2 today


-Duonebs prn





Acute kidney injury


On presentation BUN/CR 22/1.71.  Baseline creatinine less than 1.  Patient 

received 2 L of IV fluid in emergency room.  Likely prerenal. Cr now improved to

13/1.16.





Plan


Continue to monitor kidney function


Avoid nephrotoxic agents when possible


Renal dosing as appropriate





Hyperkalemia


Patient with elevated potassium to 5.8.  BUN/CR 22/1.71.  No EKG changes. 

Calcium to stabilize myocardium. Will repeat to confirm sample has not hemolyzed

and further treat if needed. Repeat potassium 4.2. Suspect initial sample was 

hemolyzed. Will continue to monitor. 





Plan


-Laboratory artifact


-Continue to monitor





Coronary artery disease


History of coronary artery disease status post CABG.  Patient on aspirin, 

Plavix, metoprolol, statin.  We will continue home medications.





Plan


Continue aspirin, statin


Continue Plavix, metoprolol





Type 2 diabetes mellitus


Type 2 diabetes on Metformin.  Will hold in setting of BRIANNA.  Will place patient 

on insulin sliding scale and ACHS glucose checks.





Plan


ISS


ACHS glucose checks


Hold home Metformin





Hyperlipidemia


Continue home statin.





Hepatitis C


Patient with history of hepatitis C infection.  LFTs within normal limits.  Does

not appear that patient has undergone treatment for this.  Patient will need 

outpatient follow-up with primary care for further management.





Anxiety/depression


Continue home Celexa.  Patient denies SI/HI.





DVT prophylaxis - heparin SQ


FULL CODE





Case discussed with attending physician, Dr. Douglass.

## 2020-11-30 LAB
ANION GAP SERPL CALC-SCNC: 12 MMOL/L (ref 10–20)
BASOPHILS # BLD AUTO: 0.1 THOU/UL (ref 0–0.2)
BASOPHILS NFR BLD AUTO: 0.6 % (ref 0–1)
BUN SERPL-MCNC: 23 MG/DL (ref 9.8–20.1)
CALCIUM SERPL-MCNC: 8.5 MG/DL (ref 7.8–10.44)
CHLORIDE SERPL-SCNC: 102 MMOL/L (ref 98–107)
CO2 SERPL-SCNC: 25 MMOL/L (ref 22–29)
CREAT CL PREDICTED SERPL C-G-VRATE: 47 ML/MIN (ref 70–130)
EOSINOPHIL # BLD AUTO: 0.1 THOU/UL (ref 0–0.7)
EOSINOPHIL NFR BLD AUTO: 1.1 % (ref 0–10)
GLUCOSE SERPL-MCNC: 257 MG/DL (ref 70–105)
HGB BLD-MCNC: 10.4 G/DL (ref 12–16)
LYMPHOCYTES # BLD: 2 THOU/UL (ref 1.2–3.4)
LYMPHOCYTES NFR BLD AUTO: 19.8 % (ref 21–51)
MCH RBC QN AUTO: 29.4 PG (ref 27–31)
MCV RBC AUTO: 89.5 FL (ref 78–98)
MONOCYTES # BLD AUTO: 1.1 THOU/UL (ref 0.11–0.59)
MONOCYTES NFR BLD AUTO: 11.1 % (ref 0–10)
NEUTROPHILS # BLD AUTO: 6.9 THOU/UL (ref 1.4–6.5)
NEUTROPHILS NFR BLD AUTO: 67.5 % (ref 42–75)
PLATELET # BLD AUTO: 265 THOU/UL (ref 130–400)
POTASSIUM SERPL-SCNC: 3.6 MMOL/L (ref 3.5–5.1)
RBC # BLD AUTO: 3.53 MILL/UL (ref 4.2–5.4)
SODIUM SERPL-SCNC: 135 MMOL/L (ref 136–145)
VANCOMYCIN TROUGH SERPL-MCNC: 14.6 UG/ML
WBC # BLD AUTO: 10.3 THOU/UL (ref 4.8–10.8)

## 2020-11-30 RX ADMIN — INSULIN LISPRO PRN UNIT: 100 INJECTION, SOLUTION INTRAVENOUS; SUBCUTANEOUS at 10:28

## 2020-11-30 RX ADMIN — Medication SCH ML: at 20:49

## 2020-11-30 RX ADMIN — INSULIN LISPRO PRN UNIT: 100 INJECTION, SOLUTION INTRAVENOUS; SUBCUTANEOUS at 06:03

## 2020-11-30 RX ADMIN — HEPARIN SODIUM SCH UNITS: 5000 INJECTION, SOLUTION INTRAVENOUS; SUBCUTANEOUS at 20:50

## 2020-11-30 RX ADMIN — VANCOMYCIN HYDROCHLORIDE SCH MLS: 1 INJECTION, SOLUTION INTRAVENOUS at 04:02

## 2020-11-30 RX ADMIN — HEPARIN SODIUM SCH UNITS: 5000 INJECTION, SOLUTION INTRAVENOUS; SUBCUTANEOUS at 09:06

## 2020-11-30 RX ADMIN — SULFAMETHOXAZOLE AND TRIMETHOPRIM SCH MLS: 80; 16 INJECTION, SOLUTION, CONCENTRATE INTRAVENOUS at 08:58

## 2020-11-30 RX ADMIN — Medication SCH ML: at 08:58

## 2020-11-30 RX ADMIN — INSULIN LISPRO PRN UNIT: 100 INJECTION, SOLUTION INTRAVENOUS; SUBCUTANEOUS at 17:11

## 2020-11-30 NOTE — PDOC.HOSPP
- Subjective


Encounter Date: 11/30/20


Subjective: 


Pt has no new complaints. Has occasional pain in LLE. 





- Objective


Vital Signs & Weight: 


                             Vital Signs (12 hours)











  Temp Pulse Resp BP Pulse Ox


 


 11/30/20 12:00  97.9 F  80  13  167/73 H  98


 


 11/30/20 10:44   75  16   95


 


 11/30/20 07:38  98.7 F  84  12  150/63 H  99


 


 11/30/20 07:16      92 L


 


 11/30/20 07:15   84  16   92 L


 


 11/30/20 04:00  98.0 F  104 H  18  151/67 H  97


 


 11/30/20 02:53   88  16   98








                                     Weight











Admit Weight                   129 lb


 


Weight                         130 lb














I&O: 


                                        











 11/29/20 11/30/20 12/01/20





 06:59 06:59 06:59


 


Intake Total 450 1800 


 


Output Total 500 2750 


 


Balance -50 -950 











Result Diagrams: 


                                 11/30/20 03:12





                                 11/30/20 03:12


Additional Labs: 


                                   Accuchecks











  11/30/20 11/30/20 11/29/20





  10:18 05:43 20:51


 


POC Glucose  240 H  304 H  264 H














  11/29/20





  17:07


 


POC Glucose  263 H














Hospitalist ROS





- Review of Systems


Constitutional: denies: fever, chills, sweats, weakness, malaise, other


Eyes: reports: pain.  denies: vision change, conjunctivae inflammation, eyelid 

inflammation, redness, other


ENT: denies: ear pain, ear discharge, nose pain, nose discharge, nose 

congestion, mouth pain, mouth swelling, throat pain, throat swelling, other


Respiratory: denies: cough, dry, shortness of breath, hemoptysis, SOB with 

excertion, pleuritic pain, sputum, wheezing, other


Cardiovascular: denies: chest pain, palpitations, orthopnea, paroxysmal noc. 

dyspnea, edema, light headedness, other


Gastrointestinal: denies: nausea, vomiting, abdominal pain, diarrhea, 

constipation, melena, hematochezia, other


Genitourinary: denies: dysuria, frequency, incontinence, hematuria, retention, 

other


Musculoskeletal: denies: neck pain, shoulder pain, arm pain, back pain, hand 

pain, leg pain, foot pain, other


Skin: denies: rash, lesions, abdelrahman, bruising, other


Neurological: denies: weakness, numbness, incoordination, change in speech, 

confusion, seizures, other





- Medication


Medications: 


Active Medications











Generic Name Dose Route Start Last Admin





  Trade Name Freq  PRN Reason Stop Dose Admin


 


Albuterol/Ipratropium  3 ml  11/28/20 10:30  11/30/20 10:44





  Ipratropium/Albuterol Sulfate 3 Ml Neb  NEB   3 ml





  [id number] DAYANNA   Administration


 


Atorvastatin Calcium  80 mg  11/26/20 21:00  11/29/20 20:50





  Atorvastatin Calcium 40 Mg Tab  PO   80 mg





  HS DAYANNA   Administration


 


Citalopram Hydrobromide  40 mg  11/27/20 09:00  11/30/20 08:53





  Citalopram 20 Mg Tab  PO   40 mg





  DAILY DAYANNA   Administration


 


Clopidogrel Bisulfate  75 mg  11/27/20 09:00  11/30/20 08:53





  Clopidogrel Bisulfate 75 Mg Tab  PO   75 mg





  QAM DAYANNA   Administration


 


Gabapentin  200 mg  11/26/20 15:00  11/30/20 08:53





  Gabapentin 100 Mg Cap  PO   200 mg





  TID DAYANNA   Administration


 


Heparin Sodium (Porcine)  5,000 units  11/28/20 21:00  11/30/20 09:06





  Heparin 5,000 Units/Ml Vial  SC   5,000 units





  BID DAYANNA   Administration


 


Vancomycin HCl 1 gm/ Device  200 mls @ 200 mls/hr  11/28/20 04:00  11/30/20 

04:02





  IVPB   200 mls





  0400 DAYANNA   Administration


 


Cefepime HCl 2 gm/ Sodium  100 mls @ 200 mls/hr  11/28/20 09:00  11/30/20 10:27





  Chloride  IVPB   100 mls





  Q12HR DAYANNA   Administration


 


Trimethoprim/Sulfamethoxazole  125 mls @ 125 mls/hr  11/29/20 21:00  11/30/20 

08:58





  200 mg/ Dextrose/Water  IVPB   125 mls





  Q12HR DAYANNA   Administration


 


Insulin Human Lispro  0 units  11/26/20 13:00  11/30/20 10:28





  Humalog 300 Units/3 Ml Vial  SC   3 unit





  .MILD SLIDING SCALE PRN   Administration





  Mild Correctional Scale  


 


Insulin Human Lispro  0 units  11/26/20 13:00  11/29/20 20:57





  Humalog 300 Units/3 Ml Vial  SC   3 unit





  .BEDTIME SLIDING SC PRN   Administration





  Bedtime Correctional Scale  


 


Metoprolol Tartrate  25 mg  11/27/20 09:00  11/30/20 08:55





  Metoprolol Tartrate 25 Mg Tab  PO   25 mg





  DAILY DAYANNA   Administration


 


Mirtazapine  15 mg  11/26/20 21:00  11/29/20 20:50





  Mirtazapine 15 Mg Tab  PO   15 mg





  HS DAYANNA   Administration


 


Morphine Sulfate  4 mg  11/26/20 17:38  11/30/20 13:13





  Morphine 4 Mg/Ml Vial  SLOW IVP   4 mg





  Q4H PRN   Administration





  Severe Pain (7-10)  


 


Morphine Sulfate  2 mg  11/26/20 17:38  11/30/20 04:02





  Morphine 2 Mg/Ml Vial  SLOW IVP   2 mg





  Q4H PRN   Administration





  Moderate Pain (4-6)  


 


Ondansetron HCl  4 mg  11/26/20 17:39  11/27/20 09:30





  Ondansetron Odt 4 Mg Tab  PO   4 mg





  Q6H PRN   Administration





  Nausea/Vomiting  


 


Saccharomyces Boulardii  250 mg  11/27/20 09:00  11/30/20 08:55





  Saccharomyces Boulardii 250 Mg Cap  PO   250 mg





  DAILY DAYANNA   Administration


 


Sodium Chloride  10 ml  11/28/20 09:00  11/30/20 08:58





  Flush - Normal Saline 10 Ml Syringe  IVF   10 ml





  Q12HR DAYANNA   Administration














- Exam


General Appearance: NAD


Eye: PERRL


ENT: normocephalic atraumatic, no oropharyngeal lesions


Neck: supple, symmetric, no JVD, no thyromegaly, no lymphadenopathy


Heart: RRR, no murmur, no gallops, normal peripheral pulses, diminshed 

peripheral pulses


Respiratory: CTAB, no wheezes, no rales, no ronchi, normal chest expansion


Gastrointestinal: soft, non-tender, non-distended, normal bowel sounds


Extremities: no clubbing, no edema


Extremities - other findings: Dressing and wound vac over LLE


Skin: no lesions, no rashes


Neurological: cranial nerve grossly intact, no focal deficits


Musculoskeletal: normal strength, no muscle wasting


Psychiatric: normal affect, normal behavior, A&O x 3





Hosp A/P


(1) PVD (peripheral vascular disease)


Code(s): I73.9 - PERIPHERAL VASCULAR DISEASE, UNSPECIFIED   Status: Acute   


Plan: 


Pt is s/p CTA aorta runoff which showed severe dz. Pt has BL occluded iliacs and

L SFa. She was s/p angiogram on 11/27, with opening of iliac vessel but 

inability to restore flow. Pt was advised on BKA due to sever PVD w occlusion 

but has declined BKA. Will cont ASA and Plavix for now.  








(2) Lower extremity ulceration


Code(s): L97.909 - NON-PRS CHRONIC ULC UNSP PRT OF UNSP LOW LEG W UNSP SEVERITY 

 Status: Acute   


Qualifiers: 


   Laterality: left 


Plan: 


Made worse by severe PVD. Pt is s/p I and D and wound vac placemet. Will f/u 

with further recs from SX. Control pain. 








(3) CAD (coronary artery disease)


Code(s): I25.10 - ATHSCL HEART DISEASE OF NATIVE CORONARY ARTERY W/O ANG PCTRS  

Status: Chronic   


Qualifiers: 


   Coronary Disease-Associated Artery/Lesion type: native artery   Native vs. 

transplanted heart: native heart 


Plan: 


Stable. Cont med mgt.








(4) Diabetes


Code(s): E11.9 - TYPE 2 DIABETES MELLITUS WITHOUT COMPLICATIONS   Status: 

Chronic   


Qualifiers: 


   Diabetes mellitus type: type 2   Diabetes mellitus long term insulin use: w

ithout long term use   Diabetes mellitus complication status: with circulatory 

complication 


Plan: 


Uncontrolled. have started Glargin 20 units Daily. Will adjust Glargin dose as 

indicated. Cover with SSI. 








(5) Hypertension


Code(s): I10 - ESSENTIAL (PRIMARY) HYPERTENSION   Status: Chronic   


Qualifiers: 


   Hypertension type: essential hypertension   Qualified Code(s): I10 - 

Essential (primary) hypertension   


Plan: 


Uncontrolled. Will add Amlodipine. Cont to adjust BP meds as indicated. 








(6) Wound infection


Code(s): T14.8XXA - OTHER INJURY OF UNSPECIFIED BODY REGION, INITIAL ENCOUNTER; 

L08.9 - LOCAL INFECTION OF THE SKIN AND SUBCUTANEOUS TISSUE, UNSP   Status: 

Acute   


Plan: 


Infection of LLE. Cx are positive for Serratia, Enterococcus and S. malto. ID 

has been consulted. Will f/u with their recs. Cont current abx. 








(7) BRIANNA (acute kidney injury)


Code(s): N17.9 - ACUTE KIDNEY FAILURE, UNSPECIFIED   Status: Acute   


Plan: 


Improving. Cont to monitor Cr. 








(8) CHF (congestive heart failure)


Code(s): I50.9 - HEART FAILURE, UNSPECIFIED   Status: Acute   


Qualifiers: 


   Heart failure type: unspecified   Heart failure chronicity: chronic   

Qualified Code(s): I50.9 - Heart failure, unspecified   


Plan: 


Cont med mgt. 








(9) Anxiety


Code(s): F41.9 - ANXIETY DISORDER, UNSPECIFIED   Status: Acute   


Plan: 


Stable. Cont meds.








(10) Hep C w/o coma, chronic


Code(s): B18.2 - CHRONIC VIRAL HEPATITIS C   Status: Chronic   


Plan: 


Pt has not had this treated. Will need a referral for outpt f/u with Gi at [facility]. 








- Plan


continue antibiotics, DVT proph w/heparin





PPx: Heparin.





CODE: FULL.





Dispo: Wound eval on Tues by Sx and wound care team.

## 2020-11-30 NOTE — PDOC.EVN
Event Note





- Event Note


Event Note: 





Nursing called, monitor tech says possible ST/T wave changes. Patient sleeping, 

asymptomatic, VSS. Will check 12 lead EKG.

## 2020-11-30 NOTE — EKG
Test Reason : NOW

Blood Pressure : ***/*** mmHG

Vent. Rate : 086 BPM     Atrial Rate : 086 BPM

   P-R Int : 118 ms          QRS Dur : 070 ms

    QT Int : 368 ms       P-R-T Axes : 069 035 264 degrees

   QTc Int : 440 ms

 

Normal sinus rhythm

Low voltage QRS

Cannot rule out Anterior infarct (cited on or before 26-NOV-2020)

Nonspecific ST-T changes

Abnormal ECG

When compared with ECG of 26-NOV-2020 23:08, (Unconfirmed)

No significant change was found

Confirmed by DR. ESTEBAN ZUÑIGA (3) on 11/30/2020 4:55:49 PM

 

Referred By: DIEGO GIRON           Confirmed By:DR. ESTEBAN ZUÑIGA

## 2020-11-30 NOTE — CON
DATE OF CONSULTATION:  11/30/2020



REASON FOR CONSULTATION:  Ulcer of left leg, peripheral vascular disease.



HISTORY OF PRESENT ILLNESS:  A 49-year-old who has history of chronic smoking, 
type

2 diabetes, neuropathy, ischemic cardiomyopathy with prior bypass graft surgery 
and

hypertension, who has had a chronic ulcer in the anterior left leg for the past 
6

months and she was going to Wound Care and apparently had a wound VAC ordered, 
but

the pain became worse and she ended being admitted.  She had a debridement of 
the

area.  There was an eschar covering the entire surface of the ulcer.  Dr. Mendez

did surgical debridement on November 29th.  There was an abscess encountered 
under

the eschar that was drained.  Culture submitted and currently patient is feeling

better.  She denies any headaches.  No visual symptoms, sore throat, 
odynophagia,

dysphagia.  No cough or sputum production.  No chest pain.  No abdominal pain,

diarrhea.  No genitourinary symptoms. 



PAST MEDICAL HISTORY:  Type 2 diabetes, hypertension, ischemic cardiomyopathy 
with

prior bypass graft surgery, neuropathy, atrial fibrillation, hypertension, 
chronic

hep C, condyloma, and hyperlipidemia. 



SOCIAL HISTORY:  Lives in [place] with .  Disabled.  Quit smoking 
about

a year ago.  Drinks occasionally. 



ALLERGIES:  CODEINE.



FAMILY HISTORY:  Diabetes type 2.



CURRENT MEDICATIONS:  

1. Cefepime.

2. Glucagon.

3. Insulin.

4. Bactrim.

5. Vancomycin.



PHYSICAL EXAMINATION:

VITAL SIGNS:  T-max 99.6, is now 98; blood pressure 150/60; heart rate 82; O2

saturation 94% on 1 L. 

SKIN:  Shows the necrotic ulcer after debridement.  There are 2 long segments of

desiccated tendons along the medial aspect of the leg, still quite a bit of kind
of

yellowish exudate close to the base of the wound, very little red tissue.  The

margins are kind of desiccated with the surface of necrotic tissue along the 
margin

as well.  The patient has a peripheral IV access.  There is no lymphadenopathy. 

HEENT:  Ocular movements conjugate.  Oral cavity with numerous teeth with decay 
and

desiccation of enamel. 

NECK:  Supple.  No jugular venous distention. 

LUNGS:  Symmetric.  Clear breath sounds. 

HEART:  S1, S2.  Regular rate.  No S3 or S4. 

ABDOMEN:  Soft, not distended or tender.  No ascites.  No bladder distention. 

EXTREMITIES:  No joint inflammatory activity.  Pulses are faintly palpable in

popliteals and dorsalis pedis.  Cap refill is delayed.  She is able to move

extremities.  She is able to ambulate.  Cognitive function appears to be intact.




LABORATORY DATA:  White cell count started at 17, is down to 10.3; hemoglobin 
10.4;

platelets 265 with 67% neutrophils.  Creatinine 1.32, which is improved from

admission.  Liver profile was normal.  Albumin 3.1.  SARS-CoV-2 PCR negative.

Cultures from the area with Serratia marcescens, obtained from 2 different 
samples,

Stenotrophomonas maltophilia.  She also had Proteus mirabilis.  All organisms 
were

very broad susceptibility profile except for Stenotrophomonas maltophilia.

Echocardiogram, EF 55%.  Other findings are not particularly remarkable.  Chest

x-ray with some perihilar opacities.  MRI did not show any bony involvement.  
Aorta

with runoff, CTA on November 19th with severe disease in lower extremities,

multifocal near-complete occlusion of common iliac arteries, complete occlusion 
of

left external iliac artery, complete occlusion of left superficial femoral 
artery

with reconstitution, 2-vessel runoff to the ankle.  Severe findings in the right

side as well. 



ASSESSMENT:  Type 2 diabetes, hyperlipidemia, peripheral vascular disease, 
chronic

ulcer of left leg with underlying eschar, necrosis, and abscess with the 
organisms

described.  Dr. Bueno did a procedure on the 27th, consisted of percutaneous

angioplasty of left common femoral and external iliac arteries, stenting of the 
left

common and external iliacs. 



DISCUSSION:  Now that patient had revascularization, she will continue with 
wound

care.  Pt  is at high risk for BKA amputation or even AKA level

amputation depending on next few weeks in terms of wound progress.  We will 
switch

her to oral quinolone plus Augmentin.  Quinolones have excellent bioavailability
and

susceptibility profile is suitable for the treatment of the current process.

Duration of therapy will depend on the clinical progress of the wound, probably 
2

weeks at least. May consider hyperbaric treatment as well.







Job ID:  [id number]



[facility]

## 2020-12-01 VITALS — TEMPERATURE: 98.1 F | SYSTOLIC BLOOD PRESSURE: 129 MMHG | DIASTOLIC BLOOD PRESSURE: 59 MMHG

## 2020-12-01 LAB
ANION GAP SERPL CALC-SCNC: 14 MMOL/L (ref 10–20)
BASOPHILS # BLD AUTO: 0 THOU/UL (ref 0–0.2)
BASOPHILS NFR BLD AUTO: 0.1 % (ref 0–1)
BUN SERPL-MCNC: 25 MG/DL (ref 9.8–20.1)
CALCIUM SERPL-MCNC: 8.5 MG/DL (ref 7.8–10.44)
CHLORIDE SERPL-SCNC: 98 MMOL/L (ref 98–107)
CO2 SERPL-SCNC: 24 MMOL/L (ref 22–29)
CREAT CL PREDICTED SERPL C-G-VRATE: 45 ML/MIN (ref 70–130)
EOSINOPHIL # BLD AUTO: 0.3 THOU/UL (ref 0–0.7)
EOSINOPHIL NFR BLD AUTO: 2.1 % (ref 0–10)
GLUCOSE SERPL-MCNC: 207 MG/DL (ref 70–105)
HGB BLD-MCNC: 10.4 G/DL (ref 12–16)
LYMPHOCYTES # BLD: 1.7 THOU/UL (ref 1.2–3.4)
LYMPHOCYTES NFR BLD AUTO: 10.8 % (ref 21–51)
MCH RBC QN AUTO: 29.5 PG (ref 27–31)
MCV RBC AUTO: 88.1 FL (ref 78–98)
MONOCYTES # BLD AUTO: 1.6 THOU/UL (ref 0.11–0.59)
MONOCYTES NFR BLD AUTO: 10.4 % (ref 0–10)
NEUTROPHILS # BLD AUTO: 11.8 THOU/UL (ref 1.4–6.5)
NEUTROPHILS NFR BLD AUTO: 76.6 % (ref 42–75)
PLATELET # BLD AUTO: 274 THOU/UL (ref 130–400)
POTASSIUM SERPL-SCNC: 4 MMOL/L (ref 3.5–5.1)
RBC # BLD AUTO: 3.52 MILL/UL (ref 4.2–5.4)
SODIUM SERPL-SCNC: 132 MMOL/L (ref 136–145)
WBC # BLD AUTO: 15.4 THOU/UL (ref 4.8–10.8)

## 2020-12-01 RX ADMIN — INSULIN LISPRO PRN UNIT: 100 INJECTION, SOLUTION INTRAVENOUS; SUBCUTANEOUS at 11:36

## 2020-12-01 RX ADMIN — HEPARIN SODIUM SCH UNITS: 5000 INJECTION, SOLUTION INTRAVENOUS; SUBCUTANEOUS at 08:42

## 2020-12-01 RX ADMIN — Medication SCH ML: at 08:41

## 2020-12-01 RX ADMIN — INSULIN LISPRO PRN UNIT: 100 INJECTION, SOLUTION INTRAVENOUS; SUBCUTANEOUS at 06:04

## 2020-12-01 NOTE — PDOC.GSPN
Surgery Progress Note: Subj





- Subjective


Narrative: 





Patient is doing okay.  Pain is improved.  Wound was examined with the wound 

care team.  There is some superficial slough around the edges but the center is 

clean and granulating.  The remaining tendons look viable healthy.  No surgical 

debridement necessary today.  We are going to put some MultiDex powder on it for

some chemical debridement and continue with the VAC.  She has a wound VAC at 

home.  From a surgical standpoint she can be discharged.  She understands that I

still think it is relatively unlikely that she will completely heal this, but 

she continues to decline amputation.  She can follow-up in the wound care clinic

for VAC changes or get home health for this.  I will see her back in my clinic 

in a couple weeks.





Surgery Progress Note: Obj





- Vital signs


Vital signs: 


                            Vital Signs - Most Recent











Temp Pulse Resp BP Pulse Ox


 


 98.5 F   88   16   152/68 H  99 


 


 12/01/20 11:32  12/01/20 11:32  12/01/20 11:32  12/01/20 11:32  12/01/20 11:32














Surgery Progress Note: Results





- Labs


Result Diagrams: 


                                 12/01/20 04:09





                                 12/01/20 04:09


Lab results: 


                         Laboratory Results - last 12 hr











  12/01/20 12/01/20 12/01/20





  04:09 04:09 05:59


 


WBC  15.4 H  


 


RBC  3.52 L  


 


Hgb  10.4 L  


 


Hct  31.0 L  


 


MCV  88.1  


 


MCH  29.5  


 


MCHC  33.5  


 


RDW  14.4  


 


Plt Count  274  


 


MPV  7.6  


 


Neutrophils %  76.6 H  


 


Lymphocytes %  10.8 L  


 


Monocytes %  10.4 H  


 


Eosinophils %  2.1  


 


Basophils %  0.1  


 


Neutrophils #  11.8 H  


 


Lymphocytes #  1.7  


 


Monocytes #  1.6 H  


 


Eosinophils #  0.3  


 


Basophils #  0.0  


 


Sodium   132 L 


 


Potassium   4.0 


 


Chloride   98 


 


Carbon Dioxide   24 


 


Anion Gap   14 


 


BUN   25 H 


 


Creatinine   1.39 H 


 


Estimated GFR (MDRD)   40 


 


Glucose   207 H 


 


POC Glucose    219 H


 


Calcium   8.5 














  12/01/20





  10:51


 


WBC 


 


RBC 


 


Hgb 


 


Hct 


 


MCV 


 


MCH 


 


MCHC 


 


RDW 


 


Plt Count 


 


MPV 


 


Neutrophils % 


 


Lymphocytes % 


 


Monocytes % 


 


Eosinophils % 


 


Basophils % 


 


Neutrophils # 


 


Lymphocytes # 


 


Monocytes # 


 


Eosinophils # 


 


Basophils # 


 


Sodium 


 


Potassium 


 


Chloride 


 


Carbon Dioxide 


 


Anion Gap 


 


BUN 


 


Creatinine 


 


Estimated GFR (MDRD) 


 


Glucose 


 


POC Glucose  299 H


 


Calcium

## 2020-12-01 NOTE — PQF
CLINICAL DOCUMENTATION CLARIFICATION FORM:





Dear Dr. FAINA Palacios                                              Date: 
12/1/2020 1168



Please exercise your independent, professional judgment in responding to the 
clarification form. Clinical indicators are provided on the bottom of this form 
for your review.



Please check appropriate box(es):



   CONGESTIVE   HEART FAILURE:



A.   ACUITY



[  ] Acute          [  ] Acute on Chronic          [ X ] Chronic



B.   TYPE:

      [  ] Systolic / HFrEF      [  ] Diastolic / HFpEF       [  ] Combined 
Systolic / Diastolic

     

 [  ] Hypertensive Heart and Kidney diseas         [ X ] Hypertensive Heart 
Diseas         [  ] Hypertensive Kidney Disease

 [  ] Other diagnosis ___________

 [  ] Unable to determine



In addition, please specify:

Present on Admission (POA):  [  ] Yes             [  ] No             [  ] 
Unable to determine





For continuity of documentation, please document condition throughout progress 
notes and discharge summary.  Thank You.



To be completed by CDI/Coding staff for physician review: 

CLINICAL INDICATORS - SIGNS / SYMPTOMS / LABS  / RESULTS AND LOCATION IN EMR



 11/28  BNP  1307.6



 A/P : Congestive Heart Failure ( PN/[facility]le[facility]) 11/30



 EF visually estimated at 55-60% . diastolic dysfunction ( Echo 11/28) 



 Pt with new oxygen requirement of 2l/NC.  Denies SOB.  Likely component of CHF
given patients CABG history.  



 CXR: bilateral perihilar opacities (11/27)

   

RISKS FACTORS   / RESULTS AND LOCATION IN EMR



 History of CAD/ischemic heart disease, hypertension ( H&P/ Servando) 11/27



TREATMENTS    / RESULTS AND LOCATION IN EMR



 Supplemental oxygen (11/27  present) 



 Lasix IVP ( 11/28) 



Thank you! 





CDS Signature:  Leida Alanis RN         Phone #:[phone]           Date: 
12/1/2020



                 This is a permanent part of the Medical Record

[facility]

## 2020-12-02 NOTE — DIS
DATE OF ADMISSION:  11/26/2020



DATE OF DISCHARGE:  12/01/2020



CONSULTATIONS ON THE CASE:  

1. Rohit Guerrero MD.

2. Luc Venegas MD, Infectious Disease.

3. Sabra Mendez MD, General Surgery.



DISCHARGING DIAGNOSES:  

1. Peripheral vascular disease with history of status post CTA of aorta with runoff,

status post angiogram on 11/27 with opening of iliac vessels, but inability to

restore flow.  The patient was advised below-knee amputation due to severe

peripheral vascular disease with occlusion, but has declined any surgical

amputation.  Patient to be continued on aspirin and Plavix for now. 

2. Lower extremity ulceration.  Long-term wound VAC placement done.  Follow up with

Wound Care Clinic along with bariatric treatment advised. 

3. Coronary artery disease.

4. Diabetes mellitus type 2.

5. Benign essential hypertension.

6. History of chronic wound infection.

7. Congestive heart failure.

8. Acute kidney injury.



PHYSICAL EXAMINATION:

CVS:  S1, S2. 

CHEST:  Bilateral air entry present.  No rhonchi.  No wheeze. 

ABDOMEN:  Soft, nontender.  Bowel sounds are present. 

EXTREMITIES:  No cyanosis.



HOSPITAL COURSE:  This is a very pleasant 49-year-old  female with a

chronic wound infection associated with severe peripheral vascular disease along

with history of hypertension, diabetes mellitus, hyperlipidemia, who was admitted to

the Hospitalist Services for infected wound with severe peripheral vascular disease,

was evaluated by Cardiology Services and a CTA follow-through procedure was done,

which eventually showed evidence of iliac stenosis, which was reviewed.  Procedure

evaluation unfortunately could not open up any blood flow, so BKA was advised, but

the patient refused any kind of that procedure at this point in time, and advised to

follow up with Wound Care as an outpatient along with bariatric clinic.  The patient

remained hemodynamically optimized during her course of hospitalization, was

reviewed and evaluated by Infectious Disease and basing on culture sensitivities,

she was advised oral Augmentin therapy as well as Levaquin for a 14-day course.  The

patient remained afebrile, hemodynamically stable during discharge plan.  All

questions and concerns were addressed.  She has been advised to follow up with Wound

Care as well as bariatric care for her wound and again refused to have any BKA

procedure done at this point in time, though it is a choice of treatment.  The

patient was hemodynamically optimized on discharge. 



DISPOSITION:  Discharged home with wound care clinic followup.  Consultation follows

with Infectious Disease as well as Cardiology.  Advance directives are full code. 



The whole discharge process including discharge coordination took me more than 35

minutes. 







Job ID:  [id number]

## 2021-03-08 ENCOUNTER — HOSPITAL ENCOUNTER (INPATIENT)
Dept: HOSPITAL 92 - ERS | Age: 51
LOS: 14 days | Discharge: NURSING, REHAB OR LONG-TERM CARE | DRG: 987 | End: 2021-03-22
Attending: HOSPITALIST | Admitting: INTERNAL MEDICINE
Payer: OTHER GOVERNMENT

## 2021-03-08 VITALS — BODY MASS INDEX: 23.5 KG/M2
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
## 2021-03-08 DIAGNOSIS — I50.33: ICD-10-CM

## 2021-03-08 DIAGNOSIS — H40.211: ICD-10-CM

## 2021-03-08 DIAGNOSIS — Z20.822: ICD-10-CM

## 2021-03-08 DIAGNOSIS — E87.1: ICD-10-CM

## 2021-03-08 DIAGNOSIS — Z79.899: ICD-10-CM

## 2021-03-08 DIAGNOSIS — Z95.1: ICD-10-CM

## 2021-03-08 DIAGNOSIS — F32.9: ICD-10-CM

## 2021-03-08 DIAGNOSIS — I25.10: ICD-10-CM

## 2021-03-08 DIAGNOSIS — Z87.891: ICD-10-CM

## 2021-03-08 DIAGNOSIS — E78.1: ICD-10-CM

## 2021-03-08 DIAGNOSIS — E87.6: ICD-10-CM

## 2021-03-08 DIAGNOSIS — E11.622: ICD-10-CM

## 2021-03-08 DIAGNOSIS — H40.20X0: ICD-10-CM

## 2021-03-08 DIAGNOSIS — I25.2: ICD-10-CM

## 2021-03-08 DIAGNOSIS — S72.145A: ICD-10-CM

## 2021-03-08 DIAGNOSIS — H59.311: ICD-10-CM

## 2021-03-08 DIAGNOSIS — Z79.84: ICD-10-CM

## 2021-03-08 DIAGNOSIS — E11.51: ICD-10-CM

## 2021-03-08 DIAGNOSIS — Z88.5: ICD-10-CM

## 2021-03-08 DIAGNOSIS — E83.42: ICD-10-CM

## 2021-03-08 DIAGNOSIS — I11.0: Primary | ICD-10-CM

## 2021-03-08 DIAGNOSIS — Z90.49: ICD-10-CM

## 2021-03-08 DIAGNOSIS — L97.929: ICD-10-CM

## 2021-03-08 DIAGNOSIS — E78.5: ICD-10-CM

## 2021-03-08 DIAGNOSIS — Z79.01: ICD-10-CM

## 2021-03-08 DIAGNOSIS — F41.9: ICD-10-CM

## 2021-03-08 DIAGNOSIS — J96.01: ICD-10-CM

## 2021-03-08 DIAGNOSIS — Z88.0: ICD-10-CM

## 2021-03-08 DIAGNOSIS — I48.91: ICD-10-CM

## 2021-03-08 DIAGNOSIS — B18.2: ICD-10-CM

## 2021-03-08 DIAGNOSIS — E78.00: ICD-10-CM

## 2021-03-08 LAB
ALBUMIN SERPL BCG-MCNC: 2.8 G/DL (ref 3.5–5)
ALP SERPL-CCNC: 101 U/L (ref 40–110)
ALT SERPL W P-5'-P-CCNC: 8 U/L (ref 8–55)
ANION GAP SERPL CALC-SCNC: 13 MMOL/L (ref 10–20)
AST SERPL-CCNC: 14 U/L (ref 5–34)
BASOPHILS # BLD AUTO: 0.1 THOU/UL (ref 0–0.2)
BASOPHILS NFR BLD AUTO: 0.9 % (ref 0–1)
BILIRUB SERPL-MCNC: 0.6 MG/DL (ref 0.2–1.2)
BUN SERPL-MCNC: 7 MG/DL (ref 9.8–20.1)
CALCIUM SERPL-MCNC: 8.1 MG/DL (ref 7.8–10.44)
CHLORIDE SERPL-SCNC: 100 MMOL/L (ref 98–107)
CO2 SERPL-SCNC: 26 MMOL/L (ref 22–29)
CREAT CL PREDICTED SERPL C-G-VRATE: 0 ML/MIN (ref 70–130)
CRP SERPL-MCNC: 2.4 MG/DL
EOSINOPHIL # BLD AUTO: 0.1 THOU/UL (ref 0–0.7)
EOSINOPHIL NFR BLD AUTO: 1.6 % (ref 0–10)
GLOBULIN SER CALC-MCNC: 3.8 G/DL (ref 2.4–3.5)
GLUCOSE SERPL-MCNC: 102 MG/DL (ref 70–105)
HGB BLD-MCNC: 12.1 G/DL (ref 12–16)
LYMPHOCYTES # BLD: 1.4 THOU/UL (ref 1.2–3.4)
LYMPHOCYTES NFR BLD AUTO: 16.5 % (ref 21–51)
MCH RBC QN AUTO: 23 PG (ref 27–31)
MCV RBC AUTO: 76.1 FL (ref 78–98)
MONOCYTES # BLD AUTO: 0.8 THOU/UL (ref 0.11–0.59)
MONOCYTES NFR BLD AUTO: 10 % (ref 0–10)
NEUTROPHILS # BLD AUTO: 5.9 THOU/UL (ref 1.4–6.5)
NEUTROPHILS NFR BLD AUTO: 70.9 % (ref 42–75)
PLATELET # BLD AUTO: 367 THOU/UL (ref 130–400)
POTASSIUM SERPL-SCNC: 3.9 MMOL/L (ref 3.5–5.1)
RBC # BLD AUTO: 5.27 MILL/UL (ref 4.2–5.4)
SODIUM SERPL-SCNC: 135 MMOL/L (ref 136–145)
WBC # BLD AUTO: 8.3 THOU/UL (ref 4.8–10.8)

## 2021-03-08 PROCEDURE — U0005 INFEC AGEN DETEC AMPLI PROBE: HCPCS

## 2021-03-08 PROCEDURE — 96374 THER/PROPH/DIAG INJ IV PUSH: CPT

## 2021-03-08 PROCEDURE — 96375 TX/PRO/DX INJ NEW DRUG ADDON: CPT

## 2021-03-08 PROCEDURE — 86140 C-REACTIVE PROTEIN: CPT

## 2021-03-08 PROCEDURE — 87045 FECES CULTURE AEROBIC BACT: CPT

## 2021-03-08 PROCEDURE — 83605 ASSAY OF LACTIC ACID: CPT

## 2021-03-08 PROCEDURE — 36415 COLL VENOUS BLD VENIPUNCTURE: CPT

## 2021-03-08 PROCEDURE — 83735 ASSAY OF MAGNESIUM: CPT

## 2021-03-08 PROCEDURE — 72170 X-RAY EXAM OF PELVIS: CPT

## 2021-03-08 PROCEDURE — 70450 CT HEAD/BRAIN W/O DYE: CPT

## 2021-03-08 PROCEDURE — 84100 ASSAY OF PHOSPHORUS: CPT

## 2021-03-08 PROCEDURE — 94760 N-INVAS EAR/PLS OXIMETRY 1: CPT

## 2021-03-08 PROCEDURE — 80053 COMPREHEN METABOLIC PANEL: CPT

## 2021-03-08 PROCEDURE — 97139 UNLISTED THERAPEUTIC PX: CPT

## 2021-03-08 PROCEDURE — 85025 COMPLETE CBC W/AUTO DIFF WBC: CPT

## 2021-03-08 PROCEDURE — 93306 TTE W/DOPPLER COMPLETE: CPT

## 2021-03-08 PROCEDURE — 71045 X-RAY EXAM CHEST 1 VIEW: CPT

## 2021-03-08 PROCEDURE — 87427 SHIGA-LIKE TOXIN AG IA: CPT

## 2021-03-08 PROCEDURE — 87635 SARS-COV-2 COVID-19 AMP PRB: CPT

## 2021-03-08 PROCEDURE — 87046 STOOL CULTR AEROBIC BACT EA: CPT

## 2021-03-08 PROCEDURE — 72195 MRI PELVIS W/O DYE: CPT

## 2021-03-08 PROCEDURE — 87449 NOS EACH ORGANISM AG IA: CPT

## 2021-03-08 PROCEDURE — 87324 CLOSTRIDIUM AG IA: CPT

## 2021-03-08 PROCEDURE — U0003 INFECTIOUS AGENT DETECTION BY NUCLEIC ACID (DNA OR RNA); SEVERE ACUTE RESPIRATORY SYNDROME CORONAVIRUS 2 (SARS-COV-2) (CORONAVIRUS DISEASE [COVID-19]), AMPLIFIED PROBE TECHNIQUE, MAKING USE OF HIGH THROUGHPUT TECHNOLOGIES AS DESCRIBED BY CMS-2020-01-R: HCPCS

## 2021-03-08 PROCEDURE — 80048 BASIC METABOLIC PNL TOTAL CA: CPT

## 2021-03-08 PROCEDURE — 93005 ELECTROCARDIOGRAM TRACING: CPT

## 2021-03-08 PROCEDURE — 83880 ASSAY OF NATRIURETIC PEPTIDE: CPT

## 2021-03-08 PROCEDURE — 83630 LACTOFERRIN FECAL (QUAL): CPT

## 2021-03-08 PROCEDURE — 36416 COLLJ CAPILLARY BLOOD SPEC: CPT

## 2021-03-08 PROCEDURE — 81003 URINALYSIS AUTO W/O SCOPE: CPT

## 2021-03-08 PROCEDURE — 81015 MICROSCOPIC EXAM OF URINE: CPT

## 2021-03-08 PROCEDURE — 85652 RBC SED RATE AUTOMATED: CPT

## 2021-03-08 RX ADMIN — PILOCARPINE HYDROCHLORIDE SCH DROP: 10 SOLUTION/ DROPS OPHTHALMIC at 23:28

## 2021-03-08 RX ADMIN — PILOCARPINE HYDROCHLORIDE SCH DROP: 10 SOLUTION/ DROPS OPHTHALMIC at 22:43

## 2021-03-08 RX ADMIN — PILOCARPINE HYDROCHLORIDE SCH DROP: 10 SOLUTION/ DROPS OPHTHALMIC at 23:29

## 2021-03-09 LAB
ANION GAP SERPL CALC-SCNC: 13 MMOL/L (ref 10–20)
BASOPHILS # BLD AUTO: 0 THOU/UL (ref 0–0.2)
BASOPHILS NFR BLD AUTO: 0.4 % (ref 0–1)
BUN SERPL-MCNC: 7 MG/DL (ref 9.8–20.1)
CALCIUM SERPL-MCNC: 7.6 MG/DL (ref 7.8–10.44)
CHLORIDE SERPL-SCNC: 102 MMOL/L (ref 98–107)
CO2 SERPL-SCNC: 23 MMOL/L (ref 22–29)
CREAT CL PREDICTED SERPL C-G-VRATE: 75 ML/MIN (ref 70–130)
EOSINOPHIL # BLD AUTO: 0.1 THOU/UL (ref 0–0.7)
EOSINOPHIL NFR BLD AUTO: 1.2 % (ref 0–10)
GLUCOSE SERPL-MCNC: 90 MG/DL (ref 70–105)
HGB BLD-MCNC: 10.7 G/DL (ref 12–16)
LYMPHOCYTES # BLD: 1.4 THOU/UL (ref 1.2–3.4)
LYMPHOCYTES NFR BLD AUTO: 16.3 % (ref 21–51)
MAGNESIUM SERPL-MCNC: 1.7 MG/DL (ref 1.6–2.6)
MCH RBC QN AUTO: 23 PG (ref 27–31)
MCV RBC AUTO: 75.8 FL (ref 78–98)
MONOCYTES # BLD AUTO: 0.7 THOU/UL (ref 0.11–0.59)
MONOCYTES NFR BLD AUTO: 8.4 % (ref 0–10)
NEUTROPHILS # BLD AUTO: 6.2 THOU/UL (ref 1.4–6.5)
NEUTROPHILS NFR BLD AUTO: 73.8 % (ref 42–75)
PLATELET # BLD AUTO: 324 THOU/UL (ref 130–400)
POTASSIUM SERPL-SCNC: 3.2 MMOL/L (ref 3.5–5.1)
RBC # BLD AUTO: 4.68 MILL/UL (ref 4.2–5.4)
SODIUM SERPL-SCNC: 135 MMOL/L (ref 136–145)
WBC # BLD AUTO: 8.4 THOU/UL (ref 4.8–10.8)

## 2021-03-09 RX ADMIN — BRIMONIDINE TARTRATE SCH DROP: 2 SOLUTION OPHTHALMIC at 21:21

## 2021-03-09 RX ADMIN — PILOCARPINE HYDROCHLORIDE SCH DROP: 10 SOLUTION/ DROPS OPHTHALMIC at 13:07

## 2021-03-09 RX ADMIN — PILOCARPINE HYDROCHLORIDE SCH DROP: 10 SOLUTION/ DROPS OPHTHALMIC at 14:49

## 2021-03-09 RX ADMIN — PILOCARPINE HYDROCHLORIDE SCH DROP: 10 SOLUTION/ DROPS OPHTHALMIC at 23:59

## 2021-03-09 RX ADMIN — BRIMONIDINE TARTRATE SCH DROP: 2 SOLUTION OPHTHALMIC at 01:36

## 2021-03-09 RX ADMIN — PILOCARPINE HYDROCHLORIDE SCH DROP: 10 SOLUTION/ DROPS OPHTHALMIC at 13:58

## 2021-03-09 RX ADMIN — BRIMONIDINE TARTRATE SCH DROP: 2 SOLUTION OPHTHALMIC at 00:14

## 2021-03-09 RX ADMIN — PILOCARPINE HYDROCHLORIDE SCH DROP: 10 SOLUTION/ DROPS OPHTHALMIC at 16:08

## 2021-03-09 RX ADMIN — PILOCARPINE HYDROCHLORIDE SCH DROP: 10 SOLUTION/ DROPS OPHTHALMIC at 17:45

## 2021-03-09 RX ADMIN — ALOGLIPTIN SCH MG: 25 TABLET, FILM COATED ORAL at 08:39

## 2021-03-09 RX ADMIN — PILOCARPINE HYDROCHLORIDE SCH DROP: 10 SOLUTION/ DROPS OPHTHALMIC at 01:44

## 2021-03-09 RX ADMIN — PILOCARPINE HYDROCHLORIDE SCH DROP: 10 SOLUTION/ DROPS OPHTHALMIC at 02:28

## 2021-03-09 RX ADMIN — PILOCARPINE HYDROCHLORIDE SCH DROP: 10 SOLUTION/ DROPS OPHTHALMIC at 00:59

## 2021-03-09 RX ADMIN — PILOCARPINE HYDROCHLORIDE SCH DROP: 10 SOLUTION/ DROPS OPHTHALMIC at 17:05

## 2021-03-09 RX ADMIN — BRIMONIDINE TARTRATE SCH DROP: 2 SOLUTION OPHTHALMIC at 00:52

## 2021-03-09 RX ADMIN — PILOCARPINE HYDROCHLORIDE SCH DROP: 10 SOLUTION/ DROPS OPHTHALMIC at 03:25

## 2021-03-09 RX ADMIN — BRIMONIDINE TARTRATE SCH DROP: 2 SOLUTION OPHTHALMIC at 02:19

## 2021-03-10 LAB
ALBUMIN SERPL BCG-MCNC: 2.4 G/DL (ref 3.5–5)
ALP SERPL-CCNC: 84 U/L (ref 40–110)
ALT SERPL W P-5'-P-CCNC: 10 U/L (ref 8–55)
ANION GAP SERPL CALC-SCNC: 13 MMOL/L (ref 10–20)
AST SERPL-CCNC: 14 U/L (ref 5–34)
BASOPHILS # BLD AUTO: 0 THOU/UL (ref 0–0.2)
BASOPHILS NFR BLD AUTO: 0.1 % (ref 0–1)
BILIRUB SERPL-MCNC: 0.5 MG/DL (ref 0.2–1.2)
BUN SERPL-MCNC: 9 MG/DL (ref 9.8–20.1)
CALCIUM SERPL-MCNC: 7.9 MG/DL (ref 7.8–10.44)
CHLORIDE SERPL-SCNC: 103 MMOL/L (ref 98–107)
CO2 SERPL-SCNC: 23 MMOL/L (ref 22–29)
CREAT CL PREDICTED SERPL C-G-VRATE: 59 ML/MIN (ref 70–130)
EOSINOPHIL # BLD AUTO: 0.1 THOU/UL (ref 0–0.7)
EOSINOPHIL NFR BLD AUTO: 0.7 % (ref 0–10)
GLOBULIN SER CALC-MCNC: 3.6 G/DL (ref 2.4–3.5)
GLUCOSE SERPL-MCNC: 91 MG/DL (ref 70–105)
HGB BLD-MCNC: 11.5 G/DL (ref 12–16)
LYMPHOCYTES # BLD: 1.4 THOU/UL (ref 1.2–3.4)
LYMPHOCYTES NFR BLD AUTO: 10.7 % (ref 21–51)
MAGNESIUM SERPL-MCNC: 2.2 MG/DL (ref 1.6–2.6)
MCH RBC QN AUTO: 22.9 PG (ref 27–31)
MCV RBC AUTO: 75.9 FL (ref 78–98)
MONOCYTES # BLD AUTO: 0.7 THOU/UL (ref 0.11–0.59)
MONOCYTES NFR BLD AUTO: 5.8 % (ref 0–10)
NEUTROPHILS # BLD AUTO: 10.4 THOU/UL (ref 1.4–6.5)
NEUTROPHILS NFR BLD AUTO: 82.7 % (ref 42–75)
PLATELET # BLD AUTO: 352 THOU/UL (ref 130–400)
POTASSIUM SERPL-SCNC: 4.5 MMOL/L (ref 3.5–5.1)
RBC # BLD AUTO: 5.02 MILL/UL (ref 4.2–5.4)
SODIUM SERPL-SCNC: 134 MMOL/L (ref 136–145)
WBC # BLD AUTO: 12.6 THOU/UL (ref 4.8–10.8)

## 2021-03-10 RX ADMIN — PILOCARPINE HYDROCHLORIDE SCH DROP: 10 SOLUTION/ DROPS OPHTHALMIC at 05:14

## 2021-03-10 RX ADMIN — PILOCARPINE HYDROCHLORIDE SCH DROP: 10 SOLUTION/ DROPS OPHTHALMIC at 11:30

## 2021-03-10 RX ADMIN — PILOCARPINE HYDROCHLORIDE SCH DROP: 10 SOLUTION/ DROPS OPHTHALMIC at 17:38

## 2021-03-10 RX ADMIN — ALOGLIPTIN SCH MG: 25 TABLET, FILM COATED ORAL at 08:39

## 2021-03-10 RX ADMIN — BRIMONIDINE TARTRATE SCH DROP: 2 SOLUTION OPHTHALMIC at 20:35

## 2021-03-10 RX ADMIN — BRIMONIDINE TARTRATE SCH DROP: 2 SOLUTION OPHTHALMIC at 08:42

## 2021-03-11 LAB
ALBUMIN SERPL BCG-MCNC: 2.7 G/DL (ref 3.5–5)
ALP SERPL-CCNC: 88 U/L (ref 40–110)
ALT SERPL W P-5'-P-CCNC: 8 U/L (ref 8–55)
ANION GAP SERPL CALC-SCNC: 17 MMOL/L (ref 10–20)
AST SERPL-CCNC: 11 U/L (ref 5–34)
BASOPHILS # BLD AUTO: 0 THOU/UL (ref 0–0.2)
BASOPHILS NFR BLD AUTO: 0.1 % (ref 0–1)
BILIRUB SERPL-MCNC: 0.5 MG/DL (ref 0.2–1.2)
BUN SERPL-MCNC: 14 MG/DL (ref 9.8–20.1)
CALCIUM SERPL-MCNC: 8.2 MG/DL (ref 7.8–10.44)
CHLORIDE SERPL-SCNC: 106 MMOL/L (ref 98–107)
CO2 SERPL-SCNC: 20 MMOL/L (ref 22–29)
CREAT CL PREDICTED SERPL C-G-VRATE: 46 ML/MIN (ref 70–130)
EOSINOPHIL # BLD AUTO: 0.1 THOU/UL (ref 0–0.7)
EOSINOPHIL NFR BLD AUTO: 0.6 % (ref 0–10)
GLOBULIN SER CALC-MCNC: 3.8 G/DL (ref 2.4–3.5)
GLUCOSE SERPL-MCNC: 71 MG/DL (ref 70–105)
HGB BLD-MCNC: 12.2 G/DL (ref 12–16)
LYMPHOCYTES # BLD: 2.4 THOU/UL (ref 1.2–3.4)
LYMPHOCYTES NFR BLD AUTO: 18 % (ref 21–51)
MCH RBC QN AUTO: 23 PG (ref 27–31)
MCV RBC AUTO: 76.2 FL (ref 78–98)
MONOCYTES # BLD AUTO: 1.4 THOU/UL (ref 0.11–0.59)
MONOCYTES NFR BLD AUTO: 10.7 % (ref 0–10)
NEUTROPHILS # BLD AUTO: 9.3 THOU/UL (ref 1.4–6.5)
NEUTROPHILS NFR BLD AUTO: 70.6 % (ref 42–75)
PLATELET # BLD AUTO: 381 THOU/UL (ref 130–400)
POTASSIUM SERPL-SCNC: 4.7 MMOL/L (ref 3.5–5.1)
RBC # BLD AUTO: 5.31 MILL/UL (ref 4.2–5.4)
SODIUM SERPL-SCNC: 138 MMOL/L (ref 136–145)
WBC # BLD AUTO: 13.2 THOU/UL (ref 4.8–10.8)

## 2021-03-11 RX ADMIN — PILOCARPINE HYDROCHLORIDE SCH DROP: 10 SOLUTION/ DROPS OPHTHALMIC at 11:48

## 2021-03-11 RX ADMIN — PILOCARPINE HYDROCHLORIDE SCH DROP: 10 SOLUTION/ DROPS OPHTHALMIC at 04:33

## 2021-03-11 RX ADMIN — ALOGLIPTIN SCH MG: 25 TABLET, FILM COATED ORAL at 08:42

## 2021-03-11 RX ADMIN — PILOCARPINE HYDROCHLORIDE SCH DROP: 10 SOLUTION/ DROPS OPHTHALMIC at 00:40

## 2021-03-11 RX ADMIN — BRIMONIDINE TARTRATE SCH DROP: 2 SOLUTION OPHTHALMIC at 08:43

## 2021-03-11 RX ADMIN — BRIMONIDINE TARTRATE SCH DROP: 2 SOLUTION OPHTHALMIC at 20:04

## 2021-03-12 LAB
ANION GAP SERPL CALC-SCNC: 17 MMOL/L (ref 10–20)
ANISOCYTOSIS BLD QL SMEAR: (no result) (100X)
BACTERIA UR QL AUTO: (no result) HPF
BASOPHILS # BLD AUTO: 0 THOU/UL (ref 0–0.2)
BASOPHILS NFR BLD AUTO: 0.2 % (ref 0–1)
BUN SERPL-MCNC: 19 MG/DL (ref 9.8–20.1)
CALCIUM SERPL-MCNC: 7.9 MG/DL (ref 7.8–10.44)
CHLORIDE SERPL-SCNC: 107 MMOL/L (ref 98–107)
CO2 SERPL-SCNC: 18 MMOL/L (ref 22–29)
CREAT CL PREDICTED SERPL C-G-VRATE: 42 ML/MIN (ref 70–130)
EOSINOPHIL # BLD AUTO: 0.1 THOU/UL (ref 0–0.7)
EOSINOPHIL NFR BLD AUTO: 0.6 % (ref 0–10)
GLUCOSE SERPL-MCNC: 65 MG/DL (ref 70–105)
HGB BLD-MCNC: 11.5 G/DL (ref 12–16)
LYMPHOCYTES # BLD: 1.8 THOU/UL (ref 1.2–3.4)
LYMPHOCYTES NFR BLD AUTO: 14.9 % (ref 21–51)
MCH RBC QN AUTO: 22.7 PG (ref 27–31)
MCV RBC AUTO: 75.7 FL (ref 78–98)
MDIFF COMPLETE?: YES
MONOCYTES # BLD AUTO: 1.3 THOU/UL (ref 0.11–0.59)
MONOCYTES NFR BLD AUTO: 10.5 % (ref 0–10)
NEUTROPHILS # BLD AUTO: 9.1 THOU/UL (ref 1.4–6.5)
NEUTROPHILS NFR BLD AUTO: 73.8 % (ref 42–75)
PLATELET # BLD AUTO: 335 THOU/UL (ref 130–400)
POTASSIUM SERPL-SCNC: 4.9 MMOL/L (ref 3.5–5.1)
PROT UR STRIP.AUTO-MCNC: 200 MG/DL
RBC # BLD AUTO: 5.08 MILL/UL (ref 4.2–5.4)
RBC UR QL AUTO: (no result) HPF (ref 0–3)
SODIUM SERPL-SCNC: 137 MMOL/L (ref 136–145)
SP GR UR STRIP: 1.01 (ref 1–1.04)
WBC # BLD AUTO: 12.3 THOU/UL (ref 4.8–10.8)
WBC UR QL AUTO: (no result) HPF (ref 0–3)

## 2021-03-12 RX ADMIN — BRIMONIDINE TARTRATE SCH DROP: 2 SOLUTION OPHTHALMIC at 08:55

## 2021-03-12 RX ADMIN — ALOGLIPTIN SCH MG: 25 TABLET, FILM COATED ORAL at 08:52

## 2021-03-12 RX ADMIN — BRIMONIDINE TARTRATE SCH DROP: 2 SOLUTION OPHTHALMIC at 20:31

## 2021-03-13 LAB
ANION GAP SERPL CALC-SCNC: 19 MMOL/L (ref 10–20)
BASOPHILS # BLD AUTO: 0.1 THOU/UL (ref 0–0.2)
BASOPHILS NFR BLD AUTO: 0.6 % (ref 0–1)
BUN SERPL-MCNC: 23 MG/DL (ref 9.8–20.1)
CALCIUM SERPL-MCNC: 7.9 MG/DL (ref 7.8–10.44)
CHLORIDE SERPL-SCNC: 107 MMOL/L (ref 98–107)
CO2 SERPL-SCNC: 16 MMOL/L (ref 22–29)
CREAT CL PREDICTED SERPL C-G-VRATE: 41 ML/MIN (ref 70–130)
EOSINOPHIL # BLD AUTO: 0 THOU/UL (ref 0–0.7)
EOSINOPHIL NFR BLD AUTO: 0.2 % (ref 0–10)
GLUCOSE SERPL-MCNC: 143 MG/DL (ref 70–105)
HGB BLD-MCNC: 11.5 G/DL (ref 12–16)
LYMPHOCYTES # BLD: 1.6 THOU/UL (ref 1.2–3.4)
LYMPHOCYTES NFR BLD AUTO: 11.8 % (ref 21–51)
MCH RBC QN AUTO: 23.1 PG (ref 27–31)
MCV RBC AUTO: 77.1 FL (ref 78–98)
MONOCYTES # BLD AUTO: 1.1 THOU/UL (ref 0.11–0.59)
MONOCYTES NFR BLD AUTO: 8.4 % (ref 0–10)
NEUTROPHILS # BLD AUTO: 10.5 THOU/UL (ref 1.4–6.5)
NEUTROPHILS NFR BLD AUTO: 79 % (ref 42–75)
PLATELET # BLD AUTO: 338 THOU/UL (ref 130–400)
POTASSIUM SERPL-SCNC: 4.7 MMOL/L (ref 3.5–5.1)
RBC # BLD AUTO: 4.99 MILL/UL (ref 4.2–5.4)
SODIUM SERPL-SCNC: 137 MMOL/L (ref 136–145)
WBC # BLD AUTO: 13.3 THOU/UL (ref 4.8–10.8)

## 2021-03-13 PROCEDURE — 085K3ZZ DESTRUCTION OF LEFT LENS, PERCUTANEOUS APPROACH: ICD-10-PCS | Performed by: OPHTHALMOLOGY

## 2021-03-13 PROCEDURE — 085J3ZZ DESTRUCTION OF RIGHT LENS, PERCUTANEOUS APPROACH: ICD-10-PCS | Performed by: OPHTHALMOLOGY

## 2021-03-13 RX ADMIN — ALOGLIPTIN SCH MG: 25 TABLET, FILM COATED ORAL at 09:22

## 2021-03-13 RX ADMIN — INSULIN LISPRO PRN UNIT: 100 INJECTION, SOLUTION INTRAVENOUS; SUBCUTANEOUS at 12:39

## 2021-03-13 RX ADMIN — BRIMONIDINE TARTRATE SCH DROP: 2 SOLUTION OPHTHALMIC at 09:39

## 2021-03-13 RX ADMIN — BRIMONIDINE TARTRATE SCH DROP: 2 SOLUTION OPHTHALMIC at 21:14

## 2021-03-14 LAB
ANION GAP SERPL CALC-SCNC: 15 MMOL/L (ref 10–20)
ANISOCYTOSIS BLD QL SMEAR: (no result) (100X)
BASOPHILS # BLD AUTO: 0 THOU/UL (ref 0–0.2)
BASOPHILS NFR BLD AUTO: 0.4 % (ref 0–1)
BUN SERPL-MCNC: 23 MG/DL (ref 9.8–20.1)
CALCIUM SERPL-MCNC: 7.8 MG/DL (ref 7.8–10.44)
CHLORIDE SERPL-SCNC: 107 MMOL/L (ref 98–107)
CO2 SERPL-SCNC: 18 MMOL/L (ref 22–29)
CREAT CL PREDICTED SERPL C-G-VRATE: 48 ML/MIN (ref 70–130)
EOSINOPHIL # BLD AUTO: 0.1 THOU/UL (ref 0–0.7)
EOSINOPHIL NFR BLD AUTO: 0.7 % (ref 0–10)
GLUCOSE SERPL-MCNC: 112 MG/DL (ref 70–105)
HGB BLD-MCNC: 10.9 G/DL (ref 12–16)
LYMPHOCYTES # BLD: 1.5 THOU/UL (ref 1.2–3.4)
LYMPHOCYTES NFR BLD AUTO: 14.1 % (ref 21–51)
MCH RBC QN AUTO: 22.4 PG (ref 27–31)
MCV RBC AUTO: 74.3 FL (ref 78–98)
MDIFF COMPLETE?: YES
MONOCYTES # BLD AUTO: 1 THOU/UL (ref 0.11–0.59)
MONOCYTES NFR BLD AUTO: 9 % (ref 0–10)
NEUTROPHILS # BLD AUTO: 8 THOU/UL (ref 1.4–6.5)
NEUTROPHILS NFR BLD AUTO: 75.8 % (ref 42–75)
PLATELET # BLD AUTO: 335 THOU/UL (ref 130–400)
POTASSIUM SERPL-SCNC: 4.2 MMOL/L (ref 3.5–5.1)
RBC # BLD AUTO: 4.87 MILL/UL (ref 4.2–5.4)
SODIUM SERPL-SCNC: 136 MMOL/L (ref 136–145)
WBC # BLD AUTO: 10.6 THOU/UL (ref 4.8–10.8)

## 2021-03-14 RX ADMIN — BRIMONIDINE TARTRATE SCH DROP: 2 SOLUTION OPHTHALMIC at 20:48

## 2021-03-14 RX ADMIN — PILOCARPINE HYDROCHLORIDE SCH DROP: 10 SOLUTION/ DROPS OPHTHALMIC at 14:05

## 2021-03-14 RX ADMIN — PILOCARPINE HYDROCHLORIDE SCH DROP: 10 SOLUTION/ DROPS OPHTHALMIC at 05:17

## 2021-03-14 RX ADMIN — PILOCARPINE HYDROCHLORIDE SCH DROP: 10 SOLUTION/ DROPS OPHTHALMIC at 00:33

## 2021-03-14 RX ADMIN — BRIMONIDINE TARTRATE SCH DROP: 2 SOLUTION OPHTHALMIC at 09:04

## 2021-03-14 RX ADMIN — ALOGLIPTIN SCH MG: 25 TABLET, FILM COATED ORAL at 09:02

## 2021-03-14 RX ADMIN — PILOCARPINE HYDROCHLORIDE SCH DROP: 10 SOLUTION/ DROPS OPHTHALMIC at 17:32

## 2021-03-15 LAB
ANION GAP SERPL CALC-SCNC: 13 MMOL/L (ref 10–20)
BASOPHILS # BLD AUTO: 0 THOU/UL (ref 0–0.2)
BASOPHILS NFR BLD AUTO: 0.4 % (ref 0–1)
BUN SERPL-MCNC: 33 MG/DL (ref 9.8–20.1)
CALCIUM SERPL-MCNC: 7.8 MG/DL (ref 7.8–10.44)
CHLORIDE SERPL-SCNC: 107 MMOL/L (ref 98–107)
CO2 SERPL-SCNC: 19 MMOL/L (ref 22–29)
CREAT CL PREDICTED SERPL C-G-VRATE: 54 ML/MIN (ref 70–130)
EOSINOPHIL # BLD AUTO: 0.1 THOU/UL (ref 0–0.7)
EOSINOPHIL NFR BLD AUTO: 1.1 % (ref 0–10)
GLUCOSE SERPL-MCNC: 95 MG/DL (ref 70–105)
HGB BLD-MCNC: 11.4 G/DL (ref 12–16)
LYMPHOCYTES # BLD: 1.9 THOU/UL (ref 1.2–3.4)
LYMPHOCYTES NFR BLD AUTO: 20.6 % (ref 21–51)
MCH RBC QN AUTO: 22.9 PG (ref 27–31)
MCV RBC AUTO: 75 FL (ref 78–98)
MONOCYTES # BLD AUTO: 1.2 THOU/UL (ref 0.11–0.59)
MONOCYTES NFR BLD AUTO: 12.4 % (ref 0–10)
NEUTROPHILS # BLD AUTO: 6.1 THOU/UL (ref 1.4–6.5)
NEUTROPHILS NFR BLD AUTO: 65.6 % (ref 42–75)
PLATELET # BLD AUTO: 320 THOU/UL (ref 130–400)
POTASSIUM SERPL-SCNC: 4.2 MMOL/L (ref 3.5–5.1)
RBC # BLD AUTO: 4.96 MILL/UL (ref 4.2–5.4)
SODIUM SERPL-SCNC: 135 MMOL/L (ref 136–145)
WBC # BLD AUTO: 9.3 THOU/UL (ref 4.8–10.8)

## 2021-03-15 RX ADMIN — ALOGLIPTIN SCH MG: 25 TABLET, FILM COATED ORAL at 08:04

## 2021-03-15 RX ADMIN — PILOCARPINE HYDROCHLORIDE SCH DROP: 10 SOLUTION/ DROPS OPHTHALMIC at 17:02

## 2021-03-15 RX ADMIN — PILOCARPINE HYDROCHLORIDE SCH DROP: 10 SOLUTION/ DROPS OPHTHALMIC at 11:09

## 2021-03-15 RX ADMIN — PILOCARPINE HYDROCHLORIDE SCH DROP: 10 SOLUTION/ DROPS OPHTHALMIC at 23:46

## 2021-03-15 RX ADMIN — PILOCARPINE HYDROCHLORIDE SCH DROP: 10 SOLUTION/ DROPS OPHTHALMIC at 01:09

## 2021-03-15 RX ADMIN — PILOCARPINE HYDROCHLORIDE SCH DROP: 10 SOLUTION/ DROPS OPHTHALMIC at 07:27

## 2021-03-15 RX ADMIN — BRIMONIDINE TARTRATE SCH DROP: 2 SOLUTION OPHTHALMIC at 08:04

## 2021-03-15 RX ADMIN — BRIMONIDINE TARTRATE SCH DROP: 2 SOLUTION OPHTHALMIC at 20:36

## 2021-03-16 LAB
ANION GAP SERPL CALC-SCNC: 12 MMOL/L (ref 10–20)
BASOPHILS # BLD AUTO: 0 THOU/UL (ref 0–0.2)
BASOPHILS NFR BLD AUTO: 0.2 % (ref 0–1)
BUN SERPL-MCNC: 27 MG/DL (ref 9.8–20.1)
CALCIUM SERPL-MCNC: 8.1 MG/DL (ref 7.8–10.44)
CHLORIDE SERPL-SCNC: 104 MMOL/L (ref 98–107)
CO2 SERPL-SCNC: 25 MMOL/L (ref 22–29)
CREAT CL PREDICTED SERPL C-G-VRATE: 53 ML/MIN (ref 70–130)
EOSINOPHIL # BLD AUTO: 0.3 THOU/UL (ref 0–0.7)
EOSINOPHIL NFR BLD AUTO: 3.5 % (ref 0–10)
GLUCOSE SERPL-MCNC: 112 MG/DL (ref 70–105)
HGB BLD-MCNC: 11.3 G/DL (ref 12–16)
LYMPHOCYTES # BLD: 1.6 THOU/UL (ref 1.2–3.4)
LYMPHOCYTES NFR BLD AUTO: 22 % (ref 21–51)
MCH RBC QN AUTO: 22.9 PG (ref 27–31)
MCV RBC AUTO: 74.8 FL (ref 78–98)
MONOCYTES # BLD AUTO: 0.9 THOU/UL (ref 0.11–0.59)
MONOCYTES NFR BLD AUTO: 11.5 % (ref 0–10)
NEUTROPHILS # BLD AUTO: 4.6 THOU/UL (ref 1.4–6.5)
NEUTROPHILS NFR BLD AUTO: 62.9 % (ref 42–75)
PLATELET # BLD AUTO: 325 THOU/UL (ref 130–400)
POTASSIUM SERPL-SCNC: 4.1 MMOL/L (ref 3.5–5.1)
RBC # BLD AUTO: 4.93 MILL/UL (ref 4.2–5.4)
SODIUM SERPL-SCNC: 137 MMOL/L (ref 136–145)
WBC # BLD AUTO: 7.4 THOU/UL (ref 4.8–10.8)

## 2021-03-16 PROCEDURE — 085J3ZZ DESTRUCTION OF RIGHT LENS, PERCUTANEOUS APPROACH: ICD-10-PCS | Performed by: OPHTHALMOLOGY

## 2021-03-16 PROCEDURE — 085K3ZZ DESTRUCTION OF LEFT LENS, PERCUTANEOUS APPROACH: ICD-10-PCS | Performed by: OPHTHALMOLOGY

## 2021-03-16 RX ADMIN — PILOCARPINE HYDROCHLORIDE SCH DROP: 10 SOLUTION/ DROPS OPHTHALMIC at 20:05

## 2021-03-16 RX ADMIN — ALOGLIPTIN SCH MG: 25 TABLET, FILM COATED ORAL at 08:30

## 2021-03-16 RX ADMIN — PILOCARPINE HYDROCHLORIDE SCH DROP: 10 SOLUTION/ DROPS OPHTHALMIC at 17:19

## 2021-03-16 RX ADMIN — BRIMONIDINE TARTRATE SCH DROP: 2 SOLUTION OPHTHALMIC at 20:05

## 2021-03-16 RX ADMIN — BRIMONIDINE TARTRATE SCH DROP: 2 SOLUTION OPHTHALMIC at 08:32

## 2021-03-16 RX ADMIN — PILOCARPINE HYDROCHLORIDE SCH: 10 SOLUTION/ DROPS OPHTHALMIC at 13:52

## 2021-03-16 RX ADMIN — PILOCARPINE HYDROCHLORIDE SCH DROP: 10 SOLUTION/ DROPS OPHTHALMIC at 06:36

## 2021-03-16 RX ADMIN — INSULIN LISPRO PRN UNIT: 100 INJECTION, SOLUTION INTRAVENOUS; SUBCUTANEOUS at 06:35

## 2021-03-17 RX ADMIN — ALOGLIPTIN SCH MG: 25 TABLET, FILM COATED ORAL at 08:31

## 2021-03-17 RX ADMIN — PILOCARPINE HYDROCHLORIDE SCH DROP: 10 SOLUTION/ DROPS OPHTHALMIC at 05:38

## 2021-03-17 RX ADMIN — BRIMONIDINE TARTRATE SCH DROP: 2 SOLUTION OPHTHALMIC at 08:36

## 2021-03-17 RX ADMIN — BRIMONIDINE TARTRATE SCH DROP: 2 SOLUTION OPHTHALMIC at 20:14

## 2021-03-17 RX ADMIN — INSULIN LISPRO PRN UNIT: 100 INJECTION, SOLUTION INTRAVENOUS; SUBCUTANEOUS at 11:55

## 2021-03-18 RX ADMIN — ALOGLIPTIN SCH MG: 25 TABLET, FILM COATED ORAL at 09:37

## 2021-03-18 RX ADMIN — BRIMONIDINE TARTRATE SCH DROP: 2 SOLUTION OPHTHALMIC at 09:39

## 2021-03-18 RX ADMIN — BRIMONIDINE TARTRATE SCH DROP: 2 SOLUTION OPHTHALMIC at 20:58

## 2021-03-19 RX ADMIN — ALOGLIPTIN SCH MG: 25 TABLET, FILM COATED ORAL at 09:38

## 2021-03-19 RX ADMIN — BRIMONIDINE TARTRATE SCH DROP: 2 SOLUTION OPHTHALMIC at 20:47

## 2021-03-19 RX ADMIN — BRIMONIDINE TARTRATE SCH DROP: 2 SOLUTION OPHTHALMIC at 09:40

## 2021-03-20 RX ADMIN — BRIMONIDINE TARTRATE SCH DROP: 2 SOLUTION OPHTHALMIC at 08:25

## 2021-03-20 RX ADMIN — BRIMONIDINE TARTRATE SCH DROP: 2 SOLUTION OPHTHALMIC at 20:54

## 2021-03-20 RX ADMIN — ALOGLIPTIN SCH MG: 25 TABLET, FILM COATED ORAL at 08:25

## 2021-03-21 LAB
ANION GAP SERPL CALC-SCNC: 15 MMOL/L (ref 10–20)
BASOPHILS # BLD AUTO: 0.1 THOU/UL (ref 0–0.2)
BASOPHILS NFR BLD AUTO: 0.9 % (ref 0–1)
BUN SERPL-MCNC: 26 MG/DL (ref 9.8–20.1)
CALCIUM SERPL-MCNC: 7.9 MG/DL (ref 7.8–10.44)
CHLORIDE SERPL-SCNC: 102 MMOL/L (ref 98–107)
CO2 SERPL-SCNC: 17 MMOL/L (ref 22–29)
CREAT CL PREDICTED SERPL C-G-VRATE: 44 ML/MIN (ref 70–130)
EOSINOPHIL # BLD AUTO: 0.2 THOU/UL (ref 0–0.7)
EOSINOPHIL NFR BLD AUTO: 1.8 % (ref 0–10)
GLUCOSE SERPL-MCNC: 64 MG/DL (ref 70–105)
HGB BLD-MCNC: 10.9 G/DL (ref 12–16)
LYMPHOCYTES # BLD: 1.9 THOU/UL (ref 1.2–3.4)
LYMPHOCYTES NFR BLD AUTO: 21.9 % (ref 21–51)
MCH RBC QN AUTO: 21.9 PG (ref 27–31)
MCV RBC AUTO: 74.4 FL (ref 78–98)
MONOCYTES # BLD AUTO: 1.1 THOU/UL (ref 0.11–0.59)
MONOCYTES NFR BLD AUTO: 13.2 % (ref 0–10)
NEUTROPHILS # BLD AUTO: 5.3 THOU/UL (ref 1.4–6.5)
NEUTROPHILS NFR BLD AUTO: 62.2 % (ref 42–75)
PLATELET # BLD AUTO: 364 THOU/UL (ref 130–400)
POTASSIUM SERPL-SCNC: 4.6 MMOL/L (ref 3.5–5.1)
RBC # BLD AUTO: 4.99 MILL/UL (ref 4.2–5.4)
SODIUM SERPL-SCNC: 129 MMOL/L (ref 136–145)
WBC # BLD AUTO: 8.5 THOU/UL (ref 4.8–10.8)

## 2021-03-21 RX ADMIN — BRIMONIDINE TARTRATE SCH DROP: 2 SOLUTION OPHTHALMIC at 08:47

## 2021-03-21 RX ADMIN — ALOGLIPTIN SCH MG: 25 TABLET, FILM COATED ORAL at 08:46

## 2021-03-21 RX ADMIN — BRIMONIDINE TARTRATE SCH DROP: 2 SOLUTION OPHTHALMIC at 20:47

## 2021-03-22 ENCOUNTER — HOSPITAL ENCOUNTER (INPATIENT)
Dept: HOSPITAL 9 - MADMS | Age: 51
LOS: 15 days | Discharge: ANOTHER HOSPITAL | DRG: 291 | End: 2021-04-06
Attending: FAMILY MEDICINE | Admitting: FAMILY MEDICINE
Payer: OTHER GOVERNMENT

## 2021-03-22 VITALS — BODY MASS INDEX: 23.6 KG/M2

## 2021-03-22 VITALS — DIASTOLIC BLOOD PRESSURE: 68 MMHG | TEMPERATURE: 97.7 F | SYSTOLIC BLOOD PRESSURE: 142 MMHG

## 2021-03-22 DIAGNOSIS — E11.40: ICD-10-CM

## 2021-03-22 DIAGNOSIS — S72.002S: ICD-10-CM

## 2021-03-22 DIAGNOSIS — Z20.822: ICD-10-CM

## 2021-03-22 DIAGNOSIS — J96.21: ICD-10-CM

## 2021-03-22 DIAGNOSIS — B95.2: ICD-10-CM

## 2021-03-22 DIAGNOSIS — I25.10: ICD-10-CM

## 2021-03-22 DIAGNOSIS — R41.0: ICD-10-CM

## 2021-03-22 DIAGNOSIS — S81.802S: ICD-10-CM

## 2021-03-22 DIAGNOSIS — Z91.81: ICD-10-CM

## 2021-03-22 DIAGNOSIS — R26.9: ICD-10-CM

## 2021-03-22 DIAGNOSIS — I11.0: Primary | ICD-10-CM

## 2021-03-22 DIAGNOSIS — N39.0: ICD-10-CM

## 2021-03-22 DIAGNOSIS — I50.33: ICD-10-CM

## 2021-03-22 DIAGNOSIS — E11.51: ICD-10-CM

## 2021-03-22 DIAGNOSIS — F41.8: ICD-10-CM

## 2021-03-22 DIAGNOSIS — R00.1: ICD-10-CM

## 2021-03-22 DIAGNOSIS — R53.81: ICD-10-CM

## 2021-03-22 DIAGNOSIS — Z99.81: ICD-10-CM

## 2021-03-22 DIAGNOSIS — J18.9: ICD-10-CM

## 2021-03-22 DIAGNOSIS — H40.9: ICD-10-CM

## 2021-03-22 DIAGNOSIS — T50.905A: ICD-10-CM

## 2021-03-22 LAB
ANION GAP SERPL CALC-SCNC: 14 MMOL/L (ref 10–20)
BUN SERPL-MCNC: 25 MG/DL (ref 9.8–20.1)
CALCIUM SERPL-MCNC: 8.4 MG/DL (ref 7.8–10.44)
CHLORIDE SERPL-SCNC: 102 MMOL/L (ref 98–107)
CO2 SERPL-SCNC: 20 MMOL/L (ref 22–29)
CREAT CL PREDICTED SERPL C-G-VRATE: 47 ML/MIN (ref 70–130)
GLUCOSE SERPL-MCNC: 80 MG/DL (ref 70–105)
POTASSIUM SERPL-SCNC: 4.2 MMOL/L (ref 3.5–5.1)
SODIUM SERPL-SCNC: 132 MMOL/L (ref 136–145)

## 2021-03-22 PROCEDURE — 87205 SMEAR GRAM STAIN: CPT

## 2021-03-22 PROCEDURE — 87077 CULTURE AEROBIC IDENTIFY: CPT

## 2021-03-22 PROCEDURE — 87070 CULTURE OTHR SPECIMN AEROBIC: CPT

## 2021-03-22 PROCEDURE — 87186 SC STD MICRODIL/AGAR DIL: CPT

## 2021-03-22 PROCEDURE — 71045 X-RAY EXAM CHEST 1 VIEW: CPT

## 2021-03-22 PROCEDURE — 36416 COLLJ CAPILLARY BLOOD SPEC: CPT

## 2021-03-22 PROCEDURE — 87086 URINE CULTURE/COLONY COUNT: CPT

## 2021-03-22 PROCEDURE — 80053 COMPREHEN METABOLIC PANEL: CPT

## 2021-03-22 PROCEDURE — 94640 AIRWAY INHALATION TREATMENT: CPT

## 2021-03-22 PROCEDURE — 82550 ASSAY OF CK (CPK): CPT

## 2021-03-22 PROCEDURE — 85025 COMPLETE CBC W/AUTO DIFF WBC: CPT

## 2021-03-22 PROCEDURE — 80048 BASIC METABOLIC PNL TOTAL CA: CPT

## 2021-03-22 PROCEDURE — 87635 SARS-COV-2 COVID-19 AMP PRB: CPT

## 2021-03-22 PROCEDURE — 84484 ASSAY OF TROPONIN QUANT: CPT

## 2021-03-22 PROCEDURE — U0003 INFECTIOUS AGENT DETECTION BY NUCLEIC ACID (DNA OR RNA); SEVERE ACUTE RESPIRATORY SYNDROME CORONAVIRUS 2 (SARS-COV-2) (CORONAVIRUS DISEASE [COVID-19]), AMPLIFIED PROBE TECHNIQUE, MAKING USE OF HIGH THROUGHPUT TECHNOLOGIES AS DESCRIBED BY CMS-2020-01-R: HCPCS

## 2021-03-22 PROCEDURE — 83880 ASSAY OF NATRIURETIC PEPTIDE: CPT

## 2021-03-22 PROCEDURE — 82553 CREATINE MB FRACTION: CPT

## 2021-03-22 PROCEDURE — U0005 INFEC AGEN DETEC AMPLI PROBE: HCPCS

## 2021-03-22 RX ADMIN — ALOGLIPTIN SCH MG: 25 TABLET, FILM COATED ORAL at 10:06

## 2021-03-22 RX ADMIN — BRIMONIDINE TARTRATE SCH DROP: 2 SOLUTION OPHTHALMIC at 09:37

## 2021-03-23 LAB
ANION GAP SERPL CALC-SCNC: 12 MMOL/L (ref 10–20)
ANISOCYTOSIS BLD QL SMEAR: (no result) (100X)
BACTERIA UR QL AUTO: (no result) HPF
BASOPHILS # BLD AUTO: 0.1 THOU/UL (ref 0–0.2)
BASOPHILS NFR BLD AUTO: 0.7 % (ref 0–1)
BUN SERPL-MCNC: 21 MG/DL (ref 9.8–20.1)
CALCIUM SERPL-MCNC: 8.2 MG/DL (ref 7.8–10.44)
CHLORIDE SERPL-SCNC: 110 MMOL/L (ref 98–107)
CO2 SERPL-SCNC: 21 MMOL/L (ref 22–29)
CREAT CL PREDICTED SERPL C-G-VRATE: 54 ML/MIN (ref 70–130)
EOSINOPHIL # BLD AUTO: 0.1 THOU/UL (ref 0–0.7)
EOSINOPHIL NFR BLD AUTO: 0.9 % (ref 0–10)
GLUCOSE SERPL-MCNC: 70 MG/DL (ref 70–105)
HGB BLD-MCNC: 11.1 G/DL (ref 12–16)
LYMPHOCYTES # BLD AUTO: 1.2 THOU/UL (ref 1.2–3.4)
LYMPHOCYTES NFR BLD AUTO: 15.1 % (ref 21–51)
MCH RBC QN AUTO: 22.1 PG (ref 27–31)
MCV RBC AUTO: 72.2 FL (ref 78–98)
MDIFF COMPLETE?: YES
MICROCYTES BLD QL SMEAR: (no result) (100X)
MONOCYTES # BLD AUTO: 0.9 THOU/UL (ref 0.11–0.59)
MONOCYTES NFR BLD AUTO: 12 % (ref 0–10)
NEUTROPHILS # BLD AUTO: 5.6 THOU/UL (ref 1.4–6.5)
NEUTROPHILS NFR BLD AUTO: 71.3 % (ref 42–75)
OVALOCYTES BLD QL SMEAR: (no result) (100X)
PLATELET # BLD AUTO: 336 THOU/UL (ref 130–400)
POTASSIUM SERPL-SCNC: 3.8 MMOL/L (ref 3.5–5.1)
PROT UR STRIP.AUTO-MCNC: 100 MG/DL
RBC # BLD AUTO: 5.03 MILL/UL (ref 4.2–5.4)
RBC UR QL AUTO: (no result) HPF (ref 0–3)
SODIUM SERPL-SCNC: 139 MMOL/L (ref 136–145)
SP GR UR STRIP: 1.01 (ref 1–1.03)
WBC # BLD AUTO: 7.9 THOU/UL (ref 4.8–10.8)
WBC UR QL AUTO: (no result) HPF (ref 0–3)

## 2021-03-23 RX ADMIN — BRIMONIDINE TARTRATE SCH DROP: 2 SOLUTION/ DROPS OPHTHALMIC at 21:41

## 2021-03-23 RX ADMIN — Medication SCH: at 20:14

## 2021-03-23 RX ADMIN — BRIMONIDINE TARTRATE SCH DROP: 2 SOLUTION/ DROPS OPHTHALMIC at 09:10

## 2021-03-24 LAB
ANION GAP SERPL CALC-SCNC: 13 MMOL/L (ref 10–20)
ANISOCYTOSIS BLD QL SMEAR: (no result) (100X)
BASOPHILS # BLD AUTO: 0.1 THOU/UL (ref 0–0.2)
BASOPHILS NFR BLD AUTO: 0.9 % (ref 0–1)
BUN SERPL-MCNC: 26 MG/DL (ref 9.8–20.1)
CALCIUM SERPL-MCNC: 8.3 MG/DL (ref 7.8–10.44)
CHLORIDE SERPL-SCNC: 107 MMOL/L (ref 98–107)
CO2 SERPL-SCNC: 21 MMOL/L (ref 22–29)
CREAT CL PREDICTED SERPL C-G-VRATE: 55 ML/MIN (ref 70–130)
EOSINOPHIL # BLD AUTO: 0.1 THOU/UL (ref 0–0.7)
EOSINOPHIL NFR BLD AUTO: 1.4 % (ref 0–10)
GLUCOSE SERPL-MCNC: 104 MG/DL (ref 70–105)
HGB BLD-MCNC: 11.4 G/DL (ref 12–16)
LYMPHOCYTES # BLD AUTO: 1.9 THOU/UL (ref 1.2–3.4)
LYMPHOCYTES NFR BLD AUTO: 23.2 % (ref 21–51)
MCH RBC QN AUTO: 20.9 PG (ref 27–31)
MCV RBC AUTO: 72.8 FL (ref 78–98)
MONOCYTES # BLD AUTO: 1.1 THOU/UL (ref 0.11–0.59)
MONOCYTES NFR BLD AUTO: 13.7 % (ref 0–10)
NEUTROPHILS # BLD AUTO: 5.1 THOU/UL (ref 1.4–6.5)
NEUTROPHILS NFR BLD AUTO: 60.9 % (ref 42–75)
PLATELET # BLD AUTO: 377 THOU/UL (ref 130–400)
POTASSIUM SERPL-SCNC: 4.1 MMOL/L (ref 3.5–5.1)
RBC # BLD AUTO: 5.44 MILL/UL (ref 4.2–5.4)
SODIUM SERPL-SCNC: 137 MMOL/L (ref 136–145)
WBC # BLD AUTO: 8.3 THOU/UL (ref 4.8–10.8)

## 2021-03-24 RX ADMIN — Medication SCH: at 21:17

## 2021-03-24 RX ADMIN — BRIMONIDINE TARTRATE SCH DROP: 2 SOLUTION/ DROPS OPHTHALMIC at 08:39

## 2021-03-24 RX ADMIN — BRIMONIDINE TARTRATE SCH DROP: 2 SOLUTION/ DROPS OPHTHALMIC at 20:58

## 2021-03-25 RX ADMIN — BRIMONIDINE TARTRATE SCH DROP: 2 SOLUTION/ DROPS OPHTHALMIC at 08:25

## 2021-03-25 RX ADMIN — Medication SCH: at 21:11

## 2021-03-25 RX ADMIN — BRIMONIDINE TARTRATE SCH DROP: 2 SOLUTION/ DROPS OPHTHALMIC at 21:10

## 2021-03-26 RX ADMIN — BRIMONIDINE TARTRATE SCH DROP: 2 SOLUTION/ DROPS OPHTHALMIC at 21:55

## 2021-03-26 RX ADMIN — BRIMONIDINE TARTRATE SCH DROP: 2 SOLUTION/ DROPS OPHTHALMIC at 09:31

## 2021-03-26 RX ADMIN — Medication SCH EACH: at 21:56

## 2021-03-27 RX ADMIN — BRIMONIDINE TARTRATE SCH DROP: 2 SOLUTION/ DROPS OPHTHALMIC at 20:39

## 2021-03-27 RX ADMIN — BRIMONIDINE TARTRATE SCH DROP: 2 SOLUTION/ DROPS OPHTHALMIC at 08:20

## 2021-03-27 RX ADMIN — Medication SCH EACH: at 20:44

## 2021-03-28 RX ADMIN — BRIMONIDINE TARTRATE SCH DROP: 2 SOLUTION/ DROPS OPHTHALMIC at 22:23

## 2021-03-28 RX ADMIN — Medication SCH EACH: at 22:26

## 2021-03-28 RX ADMIN — BRIMONIDINE TARTRATE SCH DROP: 2 SOLUTION/ DROPS OPHTHALMIC at 08:26

## 2021-03-29 RX ADMIN — BRIMONIDINE TARTRATE SCH DROP: 2 SOLUTION/ DROPS OPHTHALMIC at 21:55

## 2021-03-29 RX ADMIN — Medication SCH EACH: at 21:55

## 2021-03-29 RX ADMIN — BRIMONIDINE TARTRATE SCH DROP: 2 SOLUTION/ DROPS OPHTHALMIC at 08:59

## 2021-03-30 RX ADMIN — Medication SCH: at 20:14

## 2021-03-30 RX ADMIN — BRIMONIDINE TARTRATE SCH DROP: 2 SOLUTION/ DROPS OPHTHALMIC at 09:06

## 2021-03-30 RX ADMIN — BRIMONIDINE TARTRATE SCH DROP: 2 SOLUTION/ DROPS OPHTHALMIC at 20:10

## 2021-03-31 RX ADMIN — Medication SCH EACH: at 21:13

## 2021-03-31 RX ADMIN — BRIMONIDINE TARTRATE SCH DROP: 2 SOLUTION/ DROPS OPHTHALMIC at 08:44

## 2021-03-31 RX ADMIN — BRIMONIDINE TARTRATE SCH DROP: 2 SOLUTION/ DROPS OPHTHALMIC at 21:09

## 2021-04-01 RX ADMIN — BRIMONIDINE TARTRATE SCH DROP: 2 SOLUTION/ DROPS OPHTHALMIC at 07:21

## 2021-04-01 RX ADMIN — BRIMONIDINE TARTRATE SCH DROP: 2 SOLUTION/ DROPS OPHTHALMIC at 21:46

## 2021-04-01 RX ADMIN — Medication SCH: at 21:48

## 2021-04-02 RX ADMIN — BRIMONIDINE TARTRATE SCH DRP: 2 SOLUTION/ DROPS OPHTHALMIC at 20:54

## 2021-04-02 RX ADMIN — Medication SCH: at 20:45

## 2021-04-02 RX ADMIN — BRIMONIDINE TARTRATE SCH DROP: 2 SOLUTION/ DROPS OPHTHALMIC at 08:33

## 2021-04-03 RX ADMIN — BRIMONIDINE TARTRATE SCH DRP: 2 SOLUTION/ DROPS OPHTHALMIC at 08:35

## 2021-04-03 RX ADMIN — Medication SCH: at 20:56

## 2021-04-03 RX ADMIN — BRIMONIDINE TARTRATE SCH DRP: 2 SOLUTION/ DROPS OPHTHALMIC at 20:54

## 2021-04-04 RX ADMIN — Medication SCH EACH: at 20:33

## 2021-04-04 RX ADMIN — BRIMONIDINE TARTRATE SCH DRP: 2 SOLUTION/ DROPS OPHTHALMIC at 20:30

## 2021-04-04 RX ADMIN — BRIMONIDINE TARTRATE SCH DRP: 2 SOLUTION/ DROPS OPHTHALMIC at 09:23

## 2021-04-05 LAB
ALBUMIN SERPL BCG-MCNC: 3.2 G/DL (ref 3.5–5)
ALP SERPL-CCNC: 140 U/L (ref 40–110)
ALT SERPL W P-5'-P-CCNC: 13 U/L (ref 8–55)
ANION GAP SERPL CALC-SCNC: 18 MMOL/L (ref 10–20)
AST SERPL-CCNC: 16 U/L (ref 5–34)
BILIRUB SERPL-MCNC: 0.4 MG/DL (ref 0.2–1.2)
BUN SERPL-MCNC: 56 MG/DL (ref 9.8–20.1)
CALCIUM SERPL-MCNC: 8.7 MG/DL (ref 7.8–10.44)
CHLORIDE SERPL-SCNC: 105 MMOL/L (ref 98–107)
CO2 SERPL-SCNC: 22 MMOL/L (ref 22–29)
CREAT CL PREDICTED SERPL C-G-VRATE: 47 ML/MIN (ref 70–130)
GLOBULIN SER CALC-MCNC: 3.9 G/DL (ref 2.4–3.5)
GLUCOSE SERPL-MCNC: 117 MG/DL (ref 70–105)
POTASSIUM SERPL-SCNC: 4.8 MMOL/L (ref 3.5–5.1)
SODIUM SERPL-SCNC: 140 MMOL/L (ref 136–145)

## 2021-04-05 RX ADMIN — BRIMONIDINE TARTRATE SCH DRP: 2 SOLUTION/ DROPS OPHTHALMIC at 08:28

## 2021-04-05 RX ADMIN — Medication SCH EACH: at 21:00

## 2021-04-05 RX ADMIN — BRIMONIDINE TARTRATE SCH DRP: 2 SOLUTION/ DROPS OPHTHALMIC at 20:44

## 2021-04-06 VITALS — TEMPERATURE: 97.3 F | DIASTOLIC BLOOD PRESSURE: 77 MMHG | SYSTOLIC BLOOD PRESSURE: 118 MMHG

## 2021-04-06 LAB — CK MB SERPL-MCNC: 2.2 NG/ML (ref 0–6.6)

## 2021-04-06 RX ADMIN — BRIMONIDINE TARTRATE SCH DRP: 2 SOLUTION/ DROPS OPHTHALMIC at 09:24

## 2021-04-06 RX ADMIN — BRIMONIDINE TARTRATE SCH DRP: 2 SOLUTION/ DROPS OPHTHALMIC at 20:26

## 2021-04-06 RX ADMIN — Medication SCH EACH: at 20:42

## 2021-04-07 ENCOUNTER — HOSPITAL ENCOUNTER (INPATIENT)
Dept: HOSPITAL 92 - ERS | Age: 51
LOS: 5 days | Discharge: HOME HEALTH | DRG: 981 | End: 2021-04-12
Attending: FAMILY MEDICINE | Admitting: INTERNAL MEDICINE
Payer: OTHER GOVERNMENT

## 2021-04-07 VITALS — BODY MASS INDEX: 24.5 KG/M2

## 2021-04-07 DIAGNOSIS — B18.2: ICD-10-CM

## 2021-04-07 DIAGNOSIS — D50.9: ICD-10-CM

## 2021-04-07 DIAGNOSIS — J96.01: ICD-10-CM

## 2021-04-07 DIAGNOSIS — Z79.02: ICD-10-CM

## 2021-04-07 DIAGNOSIS — H40.9: ICD-10-CM

## 2021-04-07 DIAGNOSIS — E83.42: ICD-10-CM

## 2021-04-07 DIAGNOSIS — E87.2: ICD-10-CM

## 2021-04-07 DIAGNOSIS — Z88.8: ICD-10-CM

## 2021-04-07 DIAGNOSIS — J18.9: ICD-10-CM

## 2021-04-07 DIAGNOSIS — D63.1: ICD-10-CM

## 2021-04-07 DIAGNOSIS — I48.0: ICD-10-CM

## 2021-04-07 DIAGNOSIS — Z88.0: ICD-10-CM

## 2021-04-07 DIAGNOSIS — I25.2: ICD-10-CM

## 2021-04-07 DIAGNOSIS — Z88.5: ICD-10-CM

## 2021-04-07 DIAGNOSIS — E78.5: ICD-10-CM

## 2021-04-07 DIAGNOSIS — E11.22: ICD-10-CM

## 2021-04-07 DIAGNOSIS — E87.6: ICD-10-CM

## 2021-04-07 DIAGNOSIS — Z90.49: ICD-10-CM

## 2021-04-07 DIAGNOSIS — I50.33: ICD-10-CM

## 2021-04-07 DIAGNOSIS — Z79.84: ICD-10-CM

## 2021-04-07 DIAGNOSIS — I25.10: ICD-10-CM

## 2021-04-07 DIAGNOSIS — F41.8: ICD-10-CM

## 2021-04-07 DIAGNOSIS — Z95.1: ICD-10-CM

## 2021-04-07 DIAGNOSIS — S72.002S: ICD-10-CM

## 2021-04-07 DIAGNOSIS — I13.0: Primary | ICD-10-CM

## 2021-04-07 DIAGNOSIS — E11.51: ICD-10-CM

## 2021-04-07 DIAGNOSIS — S81.802S: ICD-10-CM

## 2021-04-07 DIAGNOSIS — Z20.822: ICD-10-CM

## 2021-04-07 DIAGNOSIS — N18.30: ICD-10-CM

## 2021-04-07 DIAGNOSIS — N17.9: ICD-10-CM

## 2021-04-07 DIAGNOSIS — I70.1: ICD-10-CM

## 2021-04-07 LAB
ALBUMIN SERPL BCG-MCNC: 3.1 G/DL (ref 3.5–5)
ALP SERPL-CCNC: 127 U/L (ref 40–110)
ALT SERPL W P-5'-P-CCNC: 9 U/L (ref 8–55)
ANALYZER IN CARDIO: (no result)
ANION GAP SERPL CALC-SCNC: 18 MMOL/L (ref 10–20)
ANION GAP SERPL CALC-SCNC: 18 MMOL/L (ref 10–20)
ANION GAP SERPL CALC-SCNC: 21 MMOL/L (ref 10–20)
AST SERPL-CCNC: 13 U/L (ref 5–34)
BASE EXCESS STD BLDV CALC-SCNC: -7.5 MEQ/L
BASOPHILS # BLD AUTO: 0 THOU/UL (ref 0–0.2)
BASOPHILS NFR BLD AUTO: 0.1 % (ref 0–1)
BILIRUB SERPL-MCNC: 0.4 MG/DL (ref 0.2–1.2)
BUN SERPL-MCNC: 70 MG/DL (ref 9.8–20.1)
BUN SERPL-MCNC: 74 MG/DL (ref 9.8–20.1)
BUN SERPL-MCNC: 75 MG/DL (ref 9.8–20.1)
CA-I BLDV-MCNC: 1.05 MMOL/L (ref 1.16–1.32)
CALCIUM SERPL-MCNC: 8.6 MG/DL (ref 7.8–10.44)
CALCIUM SERPL-MCNC: 8.7 MG/DL (ref 7.8–10.44)
CALCIUM SERPL-MCNC: 8.9 MG/DL (ref 7.8–10.44)
CHLORIDE BLDV-SCNC: 110 MMOL/L (ref 98–106)
CHLORIDE SERPL-SCNC: 106 MMOL/L (ref 98–107)
CHLORIDE SERPL-SCNC: 107 MMOL/L (ref 98–107)
CHLORIDE SERPL-SCNC: 108 MMOL/L (ref 98–107)
CO2 SERPL-SCNC: 13 MMOL/L (ref 22–29)
CO2 SERPL-SCNC: 16 MMOL/L (ref 22–29)
CO2 SERPL-SCNC: 17 MMOL/L (ref 22–29)
CREAT CL PREDICTED SERPL C-G-VRATE: 0 ML/MIN (ref 70–130)
EOSINOPHIL # BLD AUTO: 0 THOU/UL (ref 0–0.7)
EOSINOPHIL NFR BLD AUTO: 0.2 % (ref 0–10)
GLOBULIN SER CALC-MCNC: 3.9 G/DL (ref 2.4–3.5)
GLUCOSE SERPL-MCNC: 151 MG/DL (ref 70–105)
GLUCOSE SERPL-MCNC: 155 MG/DL (ref 70–105)
GLUCOSE SERPL-MCNC: 157 MG/DL (ref 70–105)
HCO3 BLDV-SCNC: 17 MEQ/L (ref 22–28)
HCT VFR BLDV CALC: 32 % (ref 36–47)
HGB BLD-MCNC: 9.2 G/DL (ref 12–16)
HGB BLDV-MCNC: 10.9 G/DL (ref 11.7–16)
LYMPHOCYTES # BLD: 1.3 THOU/UL (ref 1.2–3.4)
LYMPHOCYTES NFR BLD AUTO: 12.1 % (ref 21–51)
MCH RBC QN AUTO: 22.8 PG (ref 27–31)
MCV RBC AUTO: 75.7 FL (ref 78–98)
MONOCYTES # BLD AUTO: 0.6 THOU/UL (ref 0.11–0.59)
MONOCYTES NFR BLD AUTO: 6.1 % (ref 0–10)
NEUTROPHILS # BLD AUTO: 8.6 THOU/UL (ref 1.4–6.5)
NEUTROPHILS NFR BLD AUTO: 81.5 % (ref 42–75)
PLATELET # BLD AUTO: 371 THOU/UL (ref 130–400)
POTASSIUM BLDV-SCNC: 6.1 MMOL/L (ref 3.7–5.3)
POTASSIUM SERPL-SCNC: 4.8 MMOL/L (ref 3.5–5.1)
POTASSIUM SERPL-SCNC: 6 MMOL/L (ref 3.5–5.1)
POTASSIUM SERPL-SCNC: 6.9 MMOL/L (ref 3.5–5.1)
RBC # BLD AUTO: 4.04 MILL/UL (ref 4.2–5.4)
SODIUM BLDV-SCNC: 134.6 MMOL/L (ref 133–146)
SODIUM SERPL-SCNC: 134 MMOL/L (ref 136–145)
SODIUM SERPL-SCNC: 136 MMOL/L (ref 136–145)
SODIUM SERPL-SCNC: 136 MMOL/L (ref 136–145)
WBC # BLD AUTO: 10.5 THOU/UL (ref 4.8–10.8)

## 2021-04-07 PROCEDURE — 84300 ASSAY OF URINE SODIUM: CPT

## 2021-04-07 PROCEDURE — 96374 THER/PROPH/DIAG INJ IV PUSH: CPT

## 2021-04-07 PROCEDURE — 83550 IRON BINDING TEST: CPT

## 2021-04-07 PROCEDURE — 94640 AIRWAY INHALATION TREATMENT: CPT

## 2021-04-07 PROCEDURE — 87040 BLOOD CULTURE FOR BACTERIA: CPT

## 2021-04-07 PROCEDURE — 84484 ASSAY OF TROPONIN QUANT: CPT

## 2021-04-07 PROCEDURE — 93306 TTE W/DOPPLER COMPLETE: CPT

## 2021-04-07 PROCEDURE — 93976 VASCULAR STUDY: CPT

## 2021-04-07 PROCEDURE — 71045 X-RAY EXAM CHEST 1 VIEW: CPT

## 2021-04-07 PROCEDURE — 87633 RESP VIRUS 12-25 TARGETS: CPT

## 2021-04-07 PROCEDURE — 80053 COMPREHEN METABOLIC PANEL: CPT

## 2021-04-07 PROCEDURE — 83735 ASSAY OF MAGNESIUM: CPT

## 2021-04-07 PROCEDURE — 83605 ASSAY OF LACTIC ACID: CPT

## 2021-04-07 PROCEDURE — 93005 ELECTROCARDIOGRAM TRACING: CPT

## 2021-04-07 PROCEDURE — 74175 CTA ABDOMEN W/CONTRAST: CPT

## 2021-04-07 PROCEDURE — 84540 ASSAY OF URINE/UREA-N: CPT

## 2021-04-07 PROCEDURE — 82805 BLOOD GASES W/O2 SATURATION: CPT

## 2021-04-07 PROCEDURE — 71046 X-RAY EXAM CHEST 2 VIEWS: CPT

## 2021-04-07 PROCEDURE — 76770 US EXAM ABDO BACK WALL COMP: CPT

## 2021-04-07 PROCEDURE — 85025 COMPLETE CBC W/AUTO DIFF WBC: CPT

## 2021-04-07 PROCEDURE — 82570 ASSAY OF URINE CREATININE: CPT

## 2021-04-07 PROCEDURE — 36415 COLL VENOUS BLD VENIPUNCTURE: CPT

## 2021-04-07 PROCEDURE — 36416 COLLJ CAPILLARY BLOOD SPEC: CPT

## 2021-04-07 PROCEDURE — 81001 URINALYSIS AUTO W/SCOPE: CPT

## 2021-04-07 PROCEDURE — 0240U: CPT

## 2021-04-07 PROCEDURE — 82728 ASSAY OF FERRITIN: CPT

## 2021-04-07 PROCEDURE — 87086 URINE CULTURE/COLONY COUNT: CPT

## 2021-04-07 PROCEDURE — 93010 ELECTROCARDIOGRAM REPORT: CPT

## 2021-04-07 PROCEDURE — G0378 HOSPITAL OBSERVATION PER HR: HCPCS

## 2021-04-07 PROCEDURE — 84156 ASSAY OF PROTEIN URINE: CPT

## 2021-04-07 PROCEDURE — 83880 ASSAY OF NATRIURETIC PEPTIDE: CPT

## 2021-04-07 PROCEDURE — 83540 ASSAY OF IRON: CPT

## 2021-04-07 PROCEDURE — 80048 BASIC METABOLIC PNL TOTAL CA: CPT

## 2021-04-07 RX ADMIN — BRIMONIDINE TARTRATE SCH: 2 SOLUTION OPHTHALMIC at 20:59

## 2021-04-07 RX ADMIN — ALBUTEROL SULFATE ONE: 2.5 SOLUTION RESPIRATORY (INHALATION) at 10:43

## 2021-04-07 RX ADMIN — HEPARIN SODIUM SCH UNITS: 5000 INJECTION, SOLUTION INTRAVENOUS; SUBCUTANEOUS at 20:44

## 2021-04-07 RX ADMIN — ALBUTEROL SULFATE ONE MG: 2.5 SOLUTION RESPIRATORY (INHALATION) at 10:41

## 2021-04-07 RX ADMIN — CEFTRIAXONE SCH MLS: 1 INJECTION, POWDER, FOR SOLUTION INTRAMUSCULAR; INTRAVENOUS at 11:16

## 2021-04-08 LAB
ANION GAP SERPL CALC-SCNC: 18 MMOL/L (ref 10–20)
ANION GAP SERPL CALC-SCNC: 19 MMOL/L (ref 10–20)
BASOPHILS # BLD AUTO: 0 THOU/UL (ref 0–0.2)
BASOPHILS NFR BLD AUTO: 0.3 % (ref 0–1)
BUN SERPL-MCNC: 68 MG/DL (ref 9.8–20.1)
BUN SERPL-MCNC: 72 MG/DL (ref 9.8–20.1)
CALCIUM SERPL-MCNC: 8.5 MG/DL (ref 7.8–10.44)
CALCIUM SERPL-MCNC: 8.6 MG/DL (ref 7.8–10.44)
CHLORIDE SERPL-SCNC: 106 MMOL/L (ref 98–107)
CHLORIDE SERPL-SCNC: 106 MMOL/L (ref 98–107)
CO2 SERPL-SCNC: 15 MMOL/L (ref 22–29)
CO2 SERPL-SCNC: 16 MMOL/L (ref 22–29)
CREAT CL PREDICTED SERPL C-G-VRATE: 35 ML/MIN (ref 70–130)
CREAT CL PREDICTED SERPL C-G-VRATE: 39 ML/MIN (ref 70–130)
EOSINOPHIL # BLD AUTO: 0.1 THOU/UL (ref 0–0.7)
EOSINOPHIL NFR BLD AUTO: 0.9 % (ref 0–10)
GLUCOSE SERPL-MCNC: 166 MG/DL (ref 70–105)
GLUCOSE SERPL-MCNC: 176 MG/DL (ref 70–105)
HGB BLD-MCNC: 8.3 G/DL (ref 12–16)
IRON SERPL-MCNC: 17 UG/DL (ref 50–170)
LEUKOCYTE ESTERASE UR QL STRIP.AUTO: 250 LEU/UL
LYMPHOCYTES # BLD: 1 THOU/UL (ref 1.2–3.4)
LYMPHOCYTES NFR BLD AUTO: 16.7 % (ref 21–51)
MCH RBC QN AUTO: 23.2 PG (ref 27–31)
MCV RBC AUTO: 74.1 FL (ref 78–98)
MONOCYTES # BLD AUTO: 0.6 THOU/UL (ref 0.11–0.59)
MONOCYTES NFR BLD AUTO: 9.8 % (ref 0–10)
NEUTROPHILS # BLD AUTO: 4.4 THOU/UL (ref 1.4–6.5)
NEUTROPHILS NFR BLD AUTO: 72.4 % (ref 42–75)
PLATELET # BLD AUTO: 328 THOU/UL (ref 130–400)
POTASSIUM SERPL-SCNC: 3.9 MMOL/L (ref 3.5–5.1)
POTASSIUM SERPL-SCNC: 4 MMOL/L (ref 3.5–5.1)
PROT UR STRIP.AUTO-MCNC: 70 MG/DL
PROT UR-MCNC: 73 MG/DL (ref 1–14)
RBC # BLD AUTO: 3.55 MILL/UL (ref 4.2–5.4)
SODIUM SERPL-SCNC: 136 MMOL/L (ref 136–145)
SODIUM SERPL-SCNC: 136 MMOL/L (ref 136–145)
SODIUM UR-SCNC: 44 MMOL/L
SP GR UR STRIP: 1.01 (ref 1–1.04)
UIBC SERPL-MCNC: 243 MCG/DL (ref 265–497)
UUN UR-MCNC: 643 MG/DL
WBC # BLD AUTO: 6.1 THOU/UL (ref 4.8–10.8)

## 2021-04-08 RX ADMIN — HEPARIN SODIUM SCH UNITS: 5000 INJECTION, SOLUTION INTRAVENOUS; SUBCUTANEOUS at 20:09

## 2021-04-08 RX ADMIN — INSULIN LISPRO PRN UNIT: 100 INJECTION, SOLUTION INTRAVENOUS; SUBCUTANEOUS at 12:34

## 2021-04-08 RX ADMIN — SODIUM BICARBONATE SCH MG: 325 TABLET ORAL at 20:10

## 2021-04-08 RX ADMIN — SODIUM BICARBONATE SCH MG: 325 TABLET ORAL at 14:06

## 2021-04-08 RX ADMIN — CEFTRIAXONE SCH MLS: 1 INJECTION, POWDER, FOR SOLUTION INTRAMUSCULAR; INTRAVENOUS at 10:01

## 2021-04-08 RX ADMIN — INSULIN LISPRO PRN UNIT: 100 INJECTION, SOLUTION INTRAVENOUS; SUBCUTANEOUS at 05:28

## 2021-04-08 RX ADMIN — BRIMONIDINE TARTRATE SCH DROP: 2 SOLUTION OPHTHALMIC at 02:34

## 2021-04-08 RX ADMIN — BRIMONIDINE TARTRATE SCH DROP: 2 SOLUTION OPHTHALMIC at 20:09

## 2021-04-08 RX ADMIN — HEPARIN SODIUM SCH UNITS: 5000 INJECTION, SOLUTION INTRAVENOUS; SUBCUTANEOUS at 09:55

## 2021-04-08 RX ADMIN — BRIMONIDINE TARTRATE SCH DROP: 2 SOLUTION OPHTHALMIC at 09:55

## 2021-04-09 LAB
ANION GAP SERPL CALC-SCNC: 12 MMOL/L (ref 10–20)
BASOPHILS # BLD AUTO: 0.1 THOU/UL (ref 0–0.2)
BASOPHILS NFR BLD AUTO: 0.9 % (ref 0–1)
BUN SERPL-MCNC: 42 MG/DL (ref 9.8–20.1)
CALCIUM SERPL-MCNC: 8.4 MG/DL (ref 7.8–10.44)
CHLORIDE SERPL-SCNC: 108 MMOL/L (ref 98–107)
CO2 SERPL-SCNC: 23 MMOL/L (ref 22–29)
CREAT CL PREDICTED SERPL C-G-VRATE: 50 ML/MIN (ref 70–130)
EOSINOPHIL # BLD AUTO: 0.3 THOU/UL (ref 0–0.7)
EOSINOPHIL NFR BLD AUTO: 4.8 % (ref 0–10)
GLUCOSE SERPL-MCNC: 157 MG/DL (ref 70–105)
HGB BLD-MCNC: 9.4 G/DL (ref 12–16)
LYMPHOCYTES # BLD: 1.2 THOU/UL (ref 1.2–3.4)
LYMPHOCYTES NFR BLD AUTO: 17.5 % (ref 21–51)
MCH RBC QN AUTO: 23.8 PG (ref 27–31)
MCV RBC AUTO: 73.5 FL (ref 78–98)
MONOCYTES # BLD AUTO: 0.6 THOU/UL (ref 0.11–0.59)
MONOCYTES NFR BLD AUTO: 9.5 % (ref 0–10)
NEUTROPHILS # BLD AUTO: 4.6 THOU/UL (ref 1.4–6.5)
NEUTROPHILS NFR BLD AUTO: 67.3 % (ref 42–75)
PLATELET # BLD AUTO: 379 THOU/UL (ref 130–400)
POTASSIUM SERPL-SCNC: 3.4 MMOL/L (ref 3.5–5.1)
RBC # BLD AUTO: 3.94 MILL/UL (ref 4.2–5.4)
SODIUM SERPL-SCNC: 140 MMOL/L (ref 136–145)
TROPONIN I SERPL DL<=0.01 NG/ML-MCNC: 0.01 NG/ML (ref ?–0.03)
WBC # BLD AUTO: 6.8 THOU/UL (ref 4.8–10.8)

## 2021-04-09 PROCEDURE — 0LBP0ZZ EXCISION OF LEFT LOWER LEG TENDON, OPEN APPROACH: ICD-10-PCS | Performed by: SURGERY

## 2021-04-09 RX ADMIN — HEPARIN SODIUM SCH UNITS: 5000 INJECTION, SOLUTION INTRAVENOUS; SUBCUTANEOUS at 20:52

## 2021-04-09 RX ADMIN — CEFTRIAXONE SCH MLS: 1 INJECTION, POWDER, FOR SOLUTION INTRAMUSCULAR; INTRAVENOUS at 13:12

## 2021-04-09 RX ADMIN — SODIUM BICARBONATE SCH MG: 325 TABLET ORAL at 20:50

## 2021-04-09 RX ADMIN — HEPARIN SODIUM SCH UNITS: 5000 INJECTION, SOLUTION INTRAVENOUS; SUBCUTANEOUS at 08:46

## 2021-04-09 RX ADMIN — SODIUM BICARBONATE SCH MG: 325 TABLET ORAL at 08:45

## 2021-04-09 RX ADMIN — SODIUM FERRIC GLUCONATE COMPLEX IN SUCROSE SCH MLS: 12.5 INJECTION INTRAVENOUS at 08:46

## 2021-04-09 RX ADMIN — SODIUM BICARBONATE SCH MG: 325 TABLET ORAL at 15:41

## 2021-04-09 RX ADMIN — BRIMONIDINE TARTRATE SCH DROP: 2 SOLUTION OPHTHALMIC at 20:49

## 2021-04-09 RX ADMIN — INSULIN LISPRO PRN UNIT: 100 INJECTION, SOLUTION INTRAVENOUS; SUBCUTANEOUS at 13:17

## 2021-04-09 RX ADMIN — SODIUM FERRIC GLUCONATE COMPLEX IN SUCROSE SCH MLS: 12.5 INJECTION INTRAVENOUS at 20:51

## 2021-04-09 RX ADMIN — BRIMONIDINE TARTRATE SCH DROP: 2 SOLUTION OPHTHALMIC at 08:46

## 2021-04-10 LAB
ANION GAP SERPL CALC-SCNC: 15 MMOL/L (ref 10–20)
ANISOCYTOSIS BLD QL SMEAR: (no result) (100X)
BASOPHILS # BLD AUTO: 0 THOU/UL (ref 0–0.2)
BASOPHILS NFR BLD AUTO: 0.3 % (ref 0–1)
BUN SERPL-MCNC: 27 MG/DL (ref 9.8–20.1)
CALCIUM SERPL-MCNC: 8.3 MG/DL (ref 7.8–10.44)
CHLORIDE SERPL-SCNC: 107 MMOL/L (ref 98–107)
CO2 SERPL-SCNC: 18 MMOL/L (ref 22–29)
CREAT CL PREDICTED SERPL C-G-VRATE: 57 ML/MIN (ref 70–130)
EOSINOPHIL # BLD AUTO: 0.2 THOU/UL (ref 0–0.7)
EOSINOPHIL NFR BLD AUTO: 3.7 % (ref 0–10)
GLUCOSE SERPL-MCNC: 164 MG/DL (ref 70–105)
HGB BLD-MCNC: 8.8 G/DL (ref 12–16)
LYMPHOCYTES # BLD: 1.4 THOU/UL (ref 1.2–3.4)
LYMPHOCYTES NFR BLD AUTO: 24.1 % (ref 21–51)
MCH RBC QN AUTO: 23 PG (ref 27–31)
MCV RBC AUTO: 74.3 FL (ref 78–98)
MDIFF COMPLETE?: YES
MICROCYTES BLD QL SMEAR: (no result) (100X)
MONOCYTES # BLD AUTO: 0.6 THOU/UL (ref 0.11–0.59)
MONOCYTES NFR BLD AUTO: 10.3 % (ref 0–10)
NEUTROPHILS # BLD AUTO: 3.5 THOU/UL (ref 1.4–6.5)
NEUTROPHILS NFR BLD AUTO: 61.6 % (ref 42–75)
PLATELET # BLD AUTO: 385 THOU/UL (ref 130–400)
POTASSIUM SERPL-SCNC: 4.4 MMOL/L (ref 3.5–5.1)
RBC # BLD AUTO: 3.85 MILL/UL (ref 4.2–5.4)
SODIUM SERPL-SCNC: 136 MMOL/L (ref 136–145)
WBC # BLD AUTO: 5.7 THOU/UL (ref 4.8–10.8)

## 2021-04-10 RX ADMIN — INSULIN LISPRO PRN UNIT: 100 INJECTION, SOLUTION INTRAVENOUS; SUBCUTANEOUS at 06:22

## 2021-04-10 RX ADMIN — SODIUM BICARBONATE SCH MG: 325 TABLET ORAL at 22:00

## 2021-04-10 RX ADMIN — SODIUM BICARBONATE SCH MG: 325 TABLET ORAL at 09:29

## 2021-04-10 RX ADMIN — SODIUM BICARBONATE SCH MG: 325 TABLET ORAL at 15:41

## 2021-04-10 RX ADMIN — BRIMONIDINE TARTRATE SCH DROP: 2 SOLUTION OPHTHALMIC at 09:30

## 2021-04-10 RX ADMIN — CEFTRIAXONE SCH MLS: 1 INJECTION, POWDER, FOR SOLUTION INTRAMUSCULAR; INTRAVENOUS at 10:54

## 2021-04-10 RX ADMIN — HEPARIN SODIUM SCH UNITS: 5000 INJECTION, SOLUTION INTRAVENOUS; SUBCUTANEOUS at 22:01

## 2021-04-10 RX ADMIN — BRIMONIDINE TARTRATE SCH DROP: 2 SOLUTION OPHTHALMIC at 22:06

## 2021-04-10 RX ADMIN — INSULIN LISPRO PRN UNIT: 100 INJECTION, SOLUTION INTRAVENOUS; SUBCUTANEOUS at 11:43

## 2021-04-10 RX ADMIN — HEPARIN SODIUM SCH UNITS: 5000 INJECTION, SOLUTION INTRAVENOUS; SUBCUTANEOUS at 09:27

## 2021-04-11 LAB
ANION GAP SERPL CALC-SCNC: 11 MMOL/L (ref 10–20)
BASOPHILS # BLD AUTO: 0.1 THOU/UL (ref 0–0.2)
BASOPHILS NFR BLD AUTO: 0.7 % (ref 0–1)
BUN SERPL-MCNC: 21 MG/DL (ref 9.8–20.1)
CALCIUM SERPL-MCNC: 8.4 MG/DL (ref 7.8–10.44)
CHLORIDE SERPL-SCNC: 106 MMOL/L (ref 98–107)
CO2 SERPL-SCNC: 24 MMOL/L (ref 22–29)
CREAT CL PREDICTED SERPL C-G-VRATE: 62 ML/MIN (ref 70–130)
EOSINOPHIL # BLD AUTO: 0.3 THOU/UL (ref 0–0.7)
EOSINOPHIL NFR BLD AUTO: 4.2 % (ref 0–10)
GLUCOSE SERPL-MCNC: 102 MG/DL (ref 70–105)
HGB BLD-MCNC: 9.5 G/DL (ref 12–16)
LYMPHOCYTES # BLD: 2 THOU/UL (ref 1.2–3.4)
LYMPHOCYTES NFR BLD AUTO: 27.6 % (ref 21–51)
MCH RBC QN AUTO: 23.5 PG (ref 27–31)
MCV RBC AUTO: 74.6 FL (ref 78–98)
MONOCYTES # BLD AUTO: 0.8 THOU/UL (ref 0.11–0.59)
MONOCYTES NFR BLD AUTO: 11.3 % (ref 0–10)
NEUTROPHILS # BLD AUTO: 4 THOU/UL (ref 1.4–6.5)
NEUTROPHILS NFR BLD AUTO: 56.2 % (ref 42–75)
PLATELET # BLD AUTO: 422 THOU/UL (ref 130–400)
POTASSIUM SERPL-SCNC: 4.1 MMOL/L (ref 3.5–5.1)
RBC # BLD AUTO: 4.03 MILL/UL (ref 4.2–5.4)
SODIUM SERPL-SCNC: 137 MMOL/L (ref 136–145)
WBC # BLD AUTO: 7.2 THOU/UL (ref 4.8–10.8)

## 2021-04-11 RX ADMIN — HEPARIN SODIUM SCH UNITS: 5000 INJECTION, SOLUTION INTRAVENOUS; SUBCUTANEOUS at 08:48

## 2021-04-11 RX ADMIN — SODIUM BICARBONATE SCH MG: 325 TABLET ORAL at 16:36

## 2021-04-11 RX ADMIN — SODIUM FERRIC GLUCONATE COMPLEX IN SUCROSE SCH MLS: 12.5 INJECTION INTRAVENOUS at 10:20

## 2021-04-11 RX ADMIN — BRIMONIDINE TARTRATE SCH DROP: 2 SOLUTION OPHTHALMIC at 08:48

## 2021-04-11 RX ADMIN — ALOGLIPTIN SCH MG: 25 TABLET, FILM COATED ORAL at 10:19

## 2021-04-11 RX ADMIN — SODIUM BICARBONATE SCH MG: 325 TABLET ORAL at 21:11

## 2021-04-11 RX ADMIN — SODIUM BICARBONATE SCH MG: 325 TABLET ORAL at 08:46

## 2021-04-11 RX ADMIN — BRIMONIDINE TARTRATE SCH DROP: 2 SOLUTION OPHTHALMIC at 21:10

## 2021-04-11 RX ADMIN — HEPARIN SODIUM SCH UNITS: 5000 INJECTION, SOLUTION INTRAVENOUS; SUBCUTANEOUS at 21:13

## 2021-04-11 RX ADMIN — INSULIN LISPRO PRN UNIT: 100 INJECTION, SOLUTION INTRAVENOUS; SUBCUTANEOUS at 12:08

## 2021-04-12 VITALS — SYSTOLIC BLOOD PRESSURE: 123 MMHG | DIASTOLIC BLOOD PRESSURE: 56 MMHG

## 2021-04-12 VITALS — TEMPERATURE: 98.5 F

## 2021-04-12 LAB
ANION GAP SERPL CALC-SCNC: 9 MMOL/L (ref 10–20)
BASOPHILS # BLD AUTO: 0.1 THOU/UL (ref 0–0.2)
BASOPHILS NFR BLD AUTO: 1.1 % (ref 0–1)
BUN SERPL-MCNC: 24 MG/DL (ref 9.8–20.1)
CALCIUM SERPL-MCNC: 8.2 MG/DL (ref 7.8–10.44)
CHLORIDE SERPL-SCNC: 104 MMOL/L (ref 98–107)
CO2 SERPL-SCNC: 29 MMOL/L (ref 22–29)
CREAT CL PREDICTED SERPL C-G-VRATE: 58 ML/MIN (ref 70–130)
EOSINOPHIL # BLD AUTO: 0.3 THOU/UL (ref 0–0.7)
EOSINOPHIL NFR BLD AUTO: 3.3 % (ref 0–10)
GLUCOSE SERPL-MCNC: 117 MG/DL (ref 70–105)
HGB BLD-MCNC: 9 G/DL (ref 12–16)
LYMPHOCYTES # BLD: 2 THOU/UL (ref 1.2–3.4)
LYMPHOCYTES NFR BLD AUTO: 26.6 % (ref 21–51)
MCH RBC QN AUTO: 23.7 PG (ref 27–31)
MCV RBC AUTO: 75.4 FL (ref 78–98)
MONOCYTES # BLD AUTO: 1 THOU/UL (ref 0.11–0.59)
MONOCYTES NFR BLD AUTO: 13.4 % (ref 0–10)
NEUTROPHILS # BLD AUTO: 4.2 THOU/UL (ref 1.4–6.5)
NEUTROPHILS NFR BLD AUTO: 55.6 % (ref 42–75)
PLATELET # BLD AUTO: 406 THOU/UL (ref 130–400)
POTASSIUM SERPL-SCNC: 4.3 MMOL/L (ref 3.5–5.1)
RBC # BLD AUTO: 3.8 MILL/UL (ref 4.2–5.4)
SODIUM SERPL-SCNC: 138 MMOL/L (ref 136–145)
WBC # BLD AUTO: 7.6 THOU/UL (ref 4.8–10.8)

## 2021-04-12 RX ADMIN — SODIUM FERRIC GLUCONATE COMPLEX IN SUCROSE SCH MLS: 12.5 INJECTION INTRAVENOUS at 10:46

## 2021-04-12 RX ADMIN — HEPARIN SODIUM SCH UNITS: 5000 INJECTION, SOLUTION INTRAVENOUS; SUBCUTANEOUS at 09:02

## 2021-04-12 RX ADMIN — SODIUM BICARBONATE SCH MG: 325 TABLET ORAL at 09:01

## 2021-04-12 RX ADMIN — ALOGLIPTIN SCH MG: 25 TABLET, FILM COATED ORAL at 10:45

## 2021-04-12 RX ADMIN — BRIMONIDINE TARTRATE SCH DROP: 2 SOLUTION OPHTHALMIC at 09:06

## 2021-04-12 RX ADMIN — SODIUM BICARBONATE SCH MG: 325 TABLET ORAL at 14:54

## 2021-04-22 ENCOUNTER — HOSPITAL ENCOUNTER (OUTPATIENT)
Dept: HOSPITAL 92 - CSHWCC | Age: 51
Discharge: HOME | End: 2021-04-22
Attending: NURSE PRACTITIONER
Payer: OTHER GOVERNMENT

## 2021-04-22 DIAGNOSIS — L97.223: ICD-10-CM

## 2021-04-22 DIAGNOSIS — G89.29: ICD-10-CM

## 2021-04-22 DIAGNOSIS — R60.0: ICD-10-CM

## 2021-04-22 DIAGNOSIS — E11.622: Primary | ICD-10-CM

## 2021-04-22 DIAGNOSIS — I89.0: ICD-10-CM

## 2021-04-22 DIAGNOSIS — I70.262: ICD-10-CM

## 2021-04-22 DIAGNOSIS — I70.203: ICD-10-CM

## 2021-04-22 DIAGNOSIS — E78.2: ICD-10-CM

## 2021-04-22 DIAGNOSIS — I87.2: ICD-10-CM

## 2021-04-22 PROCEDURE — 11043 DBRDMT MUSC&/FSCA 1ST 20/<: CPT

## 2021-04-22 PROCEDURE — 99213 OFFICE O/P EST LOW 20 MIN: CPT

## 2021-04-22 PROCEDURE — G0463 HOSPITAL OUTPT CLINIC VISIT: HCPCS

## 2021-05-05 ENCOUNTER — HOSPITAL ENCOUNTER (INPATIENT)
Dept: HOSPITAL 92 - ERS | Age: 51
LOS: 7 days | Discharge: HOME HEALTH | DRG: 291 | End: 2021-05-12
Attending: INTERNAL MEDICINE | Admitting: INTERNAL MEDICINE
Payer: OTHER GOVERNMENT

## 2021-05-05 VITALS — BODY MASS INDEX: 27.8 KG/M2

## 2021-05-05 DIAGNOSIS — Z88.8: ICD-10-CM

## 2021-05-05 DIAGNOSIS — I25.2: ICD-10-CM

## 2021-05-05 DIAGNOSIS — T50.1X5A: ICD-10-CM

## 2021-05-05 DIAGNOSIS — E87.1: ICD-10-CM

## 2021-05-05 DIAGNOSIS — F32.9: ICD-10-CM

## 2021-05-05 DIAGNOSIS — J96.21: ICD-10-CM

## 2021-05-05 DIAGNOSIS — J91.8: ICD-10-CM

## 2021-05-05 DIAGNOSIS — Z88.0: ICD-10-CM

## 2021-05-05 DIAGNOSIS — N18.30: ICD-10-CM

## 2021-05-05 DIAGNOSIS — Z79.82: ICD-10-CM

## 2021-05-05 DIAGNOSIS — K76.0: ICD-10-CM

## 2021-05-05 DIAGNOSIS — Z87.891: ICD-10-CM

## 2021-05-05 DIAGNOSIS — E11.621: ICD-10-CM

## 2021-05-05 DIAGNOSIS — Z20.822: ICD-10-CM

## 2021-05-05 DIAGNOSIS — L97.429: ICD-10-CM

## 2021-05-05 DIAGNOSIS — E78.2: ICD-10-CM

## 2021-05-05 DIAGNOSIS — E11.22: ICD-10-CM

## 2021-05-05 DIAGNOSIS — E87.2: ICD-10-CM

## 2021-05-05 DIAGNOSIS — Z95.1: ICD-10-CM

## 2021-05-05 DIAGNOSIS — I50.33: ICD-10-CM

## 2021-05-05 DIAGNOSIS — Z79.899: ICD-10-CM

## 2021-05-05 DIAGNOSIS — Z79.84: ICD-10-CM

## 2021-05-05 DIAGNOSIS — Z91.09: ICD-10-CM

## 2021-05-05 DIAGNOSIS — I25.10: ICD-10-CM

## 2021-05-05 DIAGNOSIS — E11.42: ICD-10-CM

## 2021-05-05 DIAGNOSIS — I13.0: Primary | ICD-10-CM

## 2021-05-05 DIAGNOSIS — B19.20: ICD-10-CM

## 2021-05-05 DIAGNOSIS — I48.91: ICD-10-CM

## 2021-05-05 DIAGNOSIS — D63.1: ICD-10-CM

## 2021-05-05 DIAGNOSIS — Z88.5: ICD-10-CM

## 2021-05-05 DIAGNOSIS — E11.51: ICD-10-CM

## 2021-05-05 DIAGNOSIS — F41.9: ICD-10-CM

## 2021-05-05 DIAGNOSIS — E87.3: ICD-10-CM

## 2021-05-05 DIAGNOSIS — S72.142A: ICD-10-CM

## 2021-05-05 DIAGNOSIS — Z90.49: ICD-10-CM

## 2021-05-05 DIAGNOSIS — N17.9: ICD-10-CM

## 2021-05-05 LAB
ALBUMIN SERPL BCG-MCNC: 3.2 G/DL (ref 3.5–5)
ALP SERPL-CCNC: 234 U/L (ref 40–110)
ALT SERPL W P-5'-P-CCNC: 15 U/L (ref 8–55)
AMYLASE PLR-CCNC: (no result) U/L
ANALYZER IN CARDIO: (no result)
ANION GAP SERPL CALC-SCNC: 18 MMOL/L (ref 10–20)
APTT PPP: 31.3 SEC (ref 22.9–36.1)
AST SERPL-CCNC: 25 U/L (ref 5–34)
BASE EXCESS STD BLDA CALC-SCNC: 0.3 MEQ/L
BASOPHILS # BLD AUTO: 0 THOU/UL (ref 0–0.2)
BASOPHILS NFR BLD AUTO: 0.7 % (ref 0–1)
BILIRUB SERPL-MCNC: 0.4 MG/DL (ref 0.2–1.2)
BUN SERPL-MCNC: 17 MG/DL (ref 9.8–20.1)
CA-I BLDA-SCNC: 1.17 MMOL/L (ref 1.12–1.3)
CALCIUM SERPL-MCNC: 9.2 MG/DL (ref 7.8–10.44)
CHLORIDE SERPL-SCNC: 101 MMOL/L (ref 98–107)
CO2 SERPL-SCNC: 20 MMOL/L (ref 22–29)
CREAT CL PREDICTED SERPL C-G-VRATE: 0 ML/MIN (ref 70–130)
D DIMER PPP FEU-MCNC: 1.84 *MCG/ML (ref 0.27–0.43)
EOSINOPHIL # BLD AUTO: 0.1 THOU/UL (ref 0–0.7)
EOSINOPHIL NFR BLD AUTO: 2 % (ref 0–10)
GLOBULIN SER CALC-MCNC: 4 G/DL (ref 2.4–3.5)
GLUCOSE PLR-MCNC: 201 MG/DL
GLUCOSE SERPL-MCNC: 156 MG/DL (ref 70–105)
HCO3 BLDA-SCNC: 25.3 MEQ/L (ref 22–28)
HCT VFR BLDA CALC: 38 % (ref 36–47)
HGB BLD-MCNC: 12.4 G/DL (ref 12–16)
HGB BLDA-MCNC: 12.9 G/DL (ref 12–16)
INR PPP: 1.1
LDH PLR L TO P-CCNC: 73 U/L
LIPASE SERPL-CCNC: 36 U/L (ref 8–78)
LYMPHOCYTES # BLD: 2.1 THOU/UL (ref 1.2–3.4)
LYMPHOCYTES NFR BLD AUTO: 33.1 % (ref 21–51)
MCH RBC QN AUTO: 26.6 PG (ref 27–31)
MCV RBC AUTO: 91.3 FL (ref 78–98)
MONOCYTES # BLD AUTO: 0.6 THOU/UL (ref 0.11–0.59)
MONOCYTES NFR BLD AUTO: 8.7 % (ref 0–10)
NEUTROPHILS # BLD AUTO: 3.5 THOU/UL (ref 1.4–6.5)
NEUTROPHILS NFR BLD AUTO: 55.4 % (ref 42–75)
NEUTROPHILS NFR FLD: 3 %
NONHEMATIC CELLS NFR FLD MANUAL: 19 %
O2 A-A PPRESDIFF RESPIRATORY: 508.82 MMHG (ref 0–20)
PCO2 BLDA: 41.9 MMHG (ref 35–45)
PH BLDA: 7.4 [PH] (ref 7.35–7.45)
PH PLR: (no result) [PH] (ref 7.6–7.66)
PLATELET # BLD AUTO: 311 THOU/UL (ref 130–400)
PO2 BLDA: 80.5 MMHG (ref 80–100)
POTASSIUM BLD-SCNC: 4.06 MMOL/L (ref 3.7–5.3)
POTASSIUM SERPL-SCNC: 4.3 MMOL/L (ref 3.5–5.1)
PROT PLR-MCNC: 1.8 G/DL
PROTHROMBIN TIME: 14 SEC (ref 12–14.7)
RBC # BLD AUTO: 4.68 MILL/UL (ref 4.2–5.4)
RBC # FLD AUTO: 1720 /CU.MM
SODIUM SERPL-SCNC: 135 MMOL/L (ref 136–145)
SPECIMEN DRAWN FROM PATIENT: (no result)
TRIGL FLD-MCNC: 12 MG/DL
WBC # BLD AUTO: 6.3 THOU/UL (ref 4.8–10.8)
WBC # FLD AUTO: 41 UL

## 2021-05-05 PROCEDURE — 76377 3D RENDER W/INTRP POSTPROCES: CPT

## 2021-05-05 PROCEDURE — 83735 ASSAY OF MAGNESIUM: CPT

## 2021-05-05 PROCEDURE — 85060 BLOOD SMEAR INTERPRETATION: CPT

## 2021-05-05 PROCEDURE — 70450 CT HEAD/BRAIN W/O DYE: CPT

## 2021-05-05 PROCEDURE — 5A09357 ASSISTANCE WITH RESPIRATORY VENTILATION, LESS THAN 24 CONSECUTIVE HOURS, CONTINUOUS POSITIVE AIRWAY PRESSURE: ICD-10-PCS | Performed by: EMERGENCY MEDICINE

## 2021-05-05 PROCEDURE — 36416 COLLJ CAPILLARY BLOOD SPEC: CPT

## 2021-05-05 PROCEDURE — 83615 LACTATE (LD) (LDH) ENZYME: CPT

## 2021-05-05 PROCEDURE — 87040 BLOOD CULTURE FOR BACTERIA: CPT

## 2021-05-05 PROCEDURE — 80053 COMPREHEN METABOLIC PANEL: CPT

## 2021-05-05 PROCEDURE — 94660 CPAP INITIATION&MGMT: CPT

## 2021-05-05 PROCEDURE — 85730 THROMBOPLASTIN TIME PARTIAL: CPT

## 2021-05-05 PROCEDURE — 94150 VITAL CAPACITY TEST: CPT

## 2021-05-05 PROCEDURE — 85025 COMPLETE CBC W/AUTO DIFF WBC: CPT

## 2021-05-05 PROCEDURE — 87070 CULTURE OTHR SPECIMN AEROBIC: CPT

## 2021-05-05 PROCEDURE — 0W993ZZ DRAINAGE OF RIGHT PLEURAL CAVITY, PERCUTANEOUS APPROACH: ICD-10-PCS | Performed by: INTERNAL MEDICINE

## 2021-05-05 PROCEDURE — 96375 TX/PRO/DX INJ NEW DRUG ADDON: CPT

## 2021-05-05 PROCEDURE — 93005 ELECTROCARDIOGRAM TRACING: CPT

## 2021-05-05 PROCEDURE — 89051 BODY FLUID CELL COUNT: CPT

## 2021-05-05 PROCEDURE — 96367 TX/PROPH/DG ADDL SEQ IV INF: CPT

## 2021-05-05 PROCEDURE — 85610 PROTHROMBIN TIME: CPT

## 2021-05-05 PROCEDURE — 88305 TISSUE EXAM BY PATHOLOGIST: CPT

## 2021-05-05 PROCEDURE — 87205 SMEAR GRAM STAIN: CPT

## 2021-05-05 PROCEDURE — 88112 CYTOPATH CELL ENHANCE TECH: CPT

## 2021-05-05 PROCEDURE — 83986 ASSAY PH BODY FLUID NOS: CPT

## 2021-05-05 PROCEDURE — 87116 MYCOBACTERIA CULTURE: CPT

## 2021-05-05 PROCEDURE — 96365 THER/PROPH/DIAG IV INF INIT: CPT

## 2021-05-05 PROCEDURE — 83690 ASSAY OF LIPASE: CPT

## 2021-05-05 PROCEDURE — 87206 SMEAR FLUORESCENT/ACID STAI: CPT

## 2021-05-05 PROCEDURE — 84157 ASSAY OF PROTEIN OTHER: CPT

## 2021-05-05 PROCEDURE — 36600 WITHDRAWAL OF ARTERIAL BLOOD: CPT

## 2021-05-05 PROCEDURE — 80069 RENAL FUNCTION PANEL: CPT

## 2021-05-05 PROCEDURE — 83880 ASSAY OF NATRIURETIC PEPTIDE: CPT

## 2021-05-05 PROCEDURE — 84478 ASSAY OF TRIGLYCERIDES: CPT

## 2021-05-05 PROCEDURE — 82150 ASSAY OF AMYLASE: CPT

## 2021-05-05 PROCEDURE — 0240U: CPT

## 2021-05-05 PROCEDURE — 85379 FIBRIN DEGRADATION QUANT: CPT

## 2021-05-05 PROCEDURE — 71045 X-RAY EXAM CHEST 1 VIEW: CPT

## 2021-05-05 PROCEDURE — 71275 CT ANGIOGRAPHY CHEST: CPT

## 2021-05-05 PROCEDURE — 80048 BASIC METABOLIC PNL TOTAL CA: CPT

## 2021-05-05 PROCEDURE — 36415 COLL VENOUS BLD VENIPUNCTURE: CPT

## 2021-05-05 PROCEDURE — 82945 GLUCOSE OTHER FLUID: CPT

## 2021-05-05 PROCEDURE — 96366 THER/PROPH/DIAG IV INF ADDON: CPT

## 2021-05-05 PROCEDURE — 84484 ASSAY OF TROPONIN QUANT: CPT

## 2021-05-05 PROCEDURE — 82805 BLOOD GASES W/O2 SATURATION: CPT

## 2021-05-06 LAB
ANION GAP SERPL CALC-SCNC: 14 MMOL/L (ref 10–20)
BASOPHILS # BLD AUTO: 0.1 THOU/UL (ref 0–0.2)
BASOPHILS NFR BLD AUTO: 1.1 % (ref 0–1)
BUN SERPL-MCNC: 17 MG/DL (ref 9.8–20.1)
CALCIUM SERPL-MCNC: 8.7 MG/DL (ref 7.8–10.44)
CHLORIDE SERPL-SCNC: 103 MMOL/L (ref 98–107)
CO2 SERPL-SCNC: 21 MMOL/L (ref 22–29)
CREAT CL PREDICTED SERPL C-G-VRATE: 49 ML/MIN (ref 70–130)
EOSINOPHIL # BLD AUTO: 0.1 THOU/UL (ref 0–0.7)
EOSINOPHIL NFR BLD AUTO: 1.4 % (ref 0–10)
GLUCOSE SERPL-MCNC: 134 MG/DL (ref 70–105)
HGB BLD-MCNC: 10.9 G/DL (ref 12–16)
LYMPHOCYTES # BLD: 1.2 THOU/UL (ref 1.2–3.4)
LYMPHOCYTES NFR BLD AUTO: 15.5 % (ref 21–51)
MAGNESIUM SERPL-MCNC: 1.8 MG/DL (ref 1.6–2.6)
MCH RBC QN AUTO: 27.2 PG (ref 27–31)
MCV RBC AUTO: 90.7 FL (ref 78–98)
MONOCYTES # BLD AUTO: 0.5 THOU/UL (ref 0.11–0.59)
MONOCYTES NFR BLD AUTO: 7.1 % (ref 0–10)
NEUTROPHILS # BLD AUTO: 5.7 THOU/UL (ref 1.4–6.5)
NEUTROPHILS NFR BLD AUTO: 74.9 % (ref 42–75)
PLATELET # BLD AUTO: 299 THOU/UL (ref 130–400)
POTASSIUM SERPL-SCNC: 4.2 MMOL/L (ref 3.5–5.1)
RBC # BLD AUTO: 4.03 MILL/UL (ref 4.2–5.4)
SODIUM SERPL-SCNC: 134 MMOL/L (ref 136–145)
WBC # BLD AUTO: 7.6 THOU/UL (ref 4.8–10.8)

## 2021-05-06 RX ADMIN — INSULIN LISPRO PRN UNIT: 100 INJECTION, SOLUTION INTRAVENOUS; SUBCUTANEOUS at 11:30

## 2021-05-06 RX ADMIN — Medication SCH ML: at 20:48

## 2021-05-06 RX ADMIN — ALOGLIPTIN SCH MG: 25 TABLET, FILM COATED ORAL at 08:34

## 2021-05-07 LAB
ANION GAP SERPL CALC-SCNC: 11 MMOL/L (ref 10–20)
ANISOCYTOSIS BLD QL SMEAR: (no result) (100X)
BASOPHILS # BLD AUTO: 0 THOU/UL (ref 0–0.2)
BASOPHILS NFR BLD AUTO: 0.5 % (ref 0–1)
BUN SERPL-MCNC: 17 MG/DL (ref 9.8–20.1)
CALCIUM SERPL-MCNC: 8.7 MG/DL (ref 7.8–10.44)
CHLORIDE SERPL-SCNC: 102 MMOL/L (ref 98–107)
CO2 SERPL-SCNC: 28 MMOL/L (ref 22–29)
CREAT CL PREDICTED SERPL C-G-VRATE: 61 ML/MIN (ref 70–130)
EOSINOPHIL # BLD AUTO: 0.2 THOU/UL (ref 0–0.7)
EOSINOPHIL NFR BLD AUTO: 2.6 % (ref 0–10)
GLUCOSE SERPL-MCNC: 85 MG/DL (ref 70–105)
HGB BLD-MCNC: 11.4 G/DL (ref 12–16)
LYMPHOCYTES # BLD: 1.7 THOU/UL (ref 1.2–3.4)
LYMPHOCYTES NFR BLD AUTO: 27.9 % (ref 21–51)
MCH RBC QN AUTO: 27.2 PG (ref 27–31)
MCV RBC AUTO: 91.9 FL (ref 78–98)
MDIFF COMPLETE?: YES
MONOCYTES # BLD AUTO: 0.7 THOU/UL (ref 0.11–0.59)
MONOCYTES NFR BLD AUTO: 12 % (ref 0–10)
NEUTROPHILS # BLD AUTO: 3.5 THOU/UL (ref 1.4–6.5)
NEUTROPHILS NFR BLD AUTO: 57 % (ref 42–75)
PLATELET # BLD AUTO: 291 THOU/UL (ref 130–400)
POTASSIUM SERPL-SCNC: 3.7 MMOL/L (ref 3.5–5.1)
RBC # BLD AUTO: 4.19 MILL/UL (ref 4.2–5.4)
SODIUM SERPL-SCNC: 137 MMOL/L (ref 136–145)
WBC # BLD AUTO: 6.2 THOU/UL (ref 4.8–10.8)

## 2021-05-07 RX ADMIN — ALOGLIPTIN SCH MG: 25 TABLET, FILM COATED ORAL at 07:57

## 2021-05-07 RX ADMIN — Medication SCH ML: at 07:58

## 2021-05-07 RX ADMIN — Medication SCH ML: at 20:39

## 2021-05-08 LAB
ANION GAP SERPL CALC-SCNC: 14 MMOL/L (ref 10–20)
BASOPHILS # BLD AUTO: 0 THOU/UL (ref 0–0.2)
BASOPHILS NFR BLD AUTO: 0 % (ref 0–1)
BUN SERPL-MCNC: 17 MG/DL (ref 9.8–20.1)
CALCIUM SERPL-MCNC: 8.7 MG/DL (ref 7.8–10.44)
CHLORIDE SERPL-SCNC: 101 MMOL/L (ref 98–107)
CO2 SERPL-SCNC: 25 MMOL/L (ref 22–29)
CREAT CL PREDICTED SERPL C-G-VRATE: 63 ML/MIN (ref 70–130)
EOSINOPHIL # BLD AUTO: 0.1 THOU/UL (ref 0–0.7)
EOSINOPHIL NFR BLD AUTO: 2 % (ref 0–10)
GLUCOSE SERPL-MCNC: 114 MG/DL (ref 70–105)
HGB BLD-MCNC: 10.7 G/DL (ref 12–16)
LYMPHOCYTES # BLD: 1.6 THOU/UL (ref 1.2–3.4)
LYMPHOCYTES NFR BLD AUTO: 24 % (ref 21–51)
MAGNESIUM SERPL-MCNC: 1.7 MG/DL (ref 1.6–2.6)
MCH RBC QN AUTO: 26.7 PG (ref 27–31)
MCV RBC AUTO: 90.9 FL (ref 78–98)
MONOCYTES # BLD AUTO: 0.8 THOU/UL (ref 0.11–0.59)
MONOCYTES NFR BLD AUTO: 11.4 % (ref 0–10)
NEUTROPHILS # BLD AUTO: 4.3 THOU/UL (ref 1.4–6.5)
NEUTROPHILS NFR BLD AUTO: 62.5 % (ref 42–75)
PLATELET # BLD AUTO: 278 THOU/UL (ref 130–400)
POTASSIUM SERPL-SCNC: 3.9 MMOL/L (ref 3.5–5.1)
RBC # BLD AUTO: 3.99 MILL/UL (ref 4.2–5.4)
SODIUM SERPL-SCNC: 136 MMOL/L (ref 136–145)
WBC # BLD AUTO: 6.8 THOU/UL (ref 4.8–10.8)

## 2021-05-08 RX ADMIN — ALOGLIPTIN SCH MG: 25 TABLET, FILM COATED ORAL at 08:37

## 2021-05-08 RX ADMIN — Medication SCH ML: at 08:39

## 2021-05-08 RX ADMIN — Medication SCH ML: at 21:13

## 2021-05-08 RX ADMIN — INSULIN LISPRO PRN UNIT: 100 INJECTION, SOLUTION INTRAVENOUS; SUBCUTANEOUS at 11:19

## 2021-05-08 RX ADMIN — INSULIN LISPRO PRN UNIT: 100 INJECTION, SOLUTION INTRAVENOUS; SUBCUTANEOUS at 16:27

## 2021-05-09 LAB
ANION GAP SERPL CALC-SCNC: 15 MMOL/L (ref 10–20)
BASOPHILS # BLD AUTO: 0.1 THOU/UL (ref 0–0.2)
BASOPHILS NFR BLD AUTO: 0.8 % (ref 0–1)
BUN SERPL-MCNC: 20 MG/DL (ref 9.8–20.1)
CALCIUM SERPL-MCNC: 9 MG/DL (ref 7.8–10.44)
CHLORIDE SERPL-SCNC: 99 MMOL/L (ref 98–107)
CO2 SERPL-SCNC: 25 MMOL/L (ref 22–29)
CREAT CL PREDICTED SERPL C-G-VRATE: 61 ML/MIN (ref 70–130)
EOSINOPHIL # BLD AUTO: 0.2 THOU/UL (ref 0–0.7)
EOSINOPHIL NFR BLD AUTO: 2.8 % (ref 0–10)
GLUCOSE SERPL-MCNC: 108 MG/DL (ref 70–105)
HGB BLD-MCNC: 11.4 G/DL (ref 12–16)
LYMPHOCYTES # BLD: 1.6 THOU/UL (ref 1.2–3.4)
LYMPHOCYTES NFR BLD AUTO: 21.6 % (ref 21–51)
MCH RBC QN AUTO: 26.7 PG (ref 27–31)
MCV RBC AUTO: 90.5 FL (ref 78–98)
MONOCYTES # BLD AUTO: 0.8 THOU/UL (ref 0.11–0.59)
MONOCYTES NFR BLD AUTO: 11.4 % (ref 0–10)
NEUTROPHILS # BLD AUTO: 4.6 THOU/UL (ref 1.4–6.5)
NEUTROPHILS NFR BLD AUTO: 63.5 % (ref 42–75)
PLATELET # BLD AUTO: 279 THOU/UL (ref 130–400)
POTASSIUM SERPL-SCNC: 4 MMOL/L (ref 3.5–5.1)
RBC # BLD AUTO: 4.27 MILL/UL (ref 4.2–5.4)
SODIUM SERPL-SCNC: 135 MMOL/L (ref 136–145)
WBC # BLD AUTO: 7.3 THOU/UL (ref 4.8–10.8)

## 2021-05-09 RX ADMIN — ALOGLIPTIN SCH MG: 25 TABLET, FILM COATED ORAL at 08:43

## 2021-05-09 RX ADMIN — Medication SCH ML: at 20:32

## 2021-05-09 RX ADMIN — Medication SCH ML: at 08:45

## 2021-05-09 RX ADMIN — INSULIN LISPRO PRN UNIT: 100 INJECTION, SOLUTION INTRAVENOUS; SUBCUTANEOUS at 12:10

## 2021-05-10 LAB
ANION GAP SERPL CALC-SCNC: 13 MMOL/L (ref 10–20)
BASOPHILS # BLD AUTO: 0.1 THOU/UL (ref 0–0.2)
BASOPHILS NFR BLD AUTO: 0.7 % (ref 0–1)
BUN SERPL-MCNC: 24 MG/DL (ref 9.8–20.1)
CALCIUM SERPL-MCNC: 9 MG/DL (ref 7.8–10.44)
CHLORIDE SERPL-SCNC: 96 MMOL/L (ref 98–107)
CO2 SERPL-SCNC: 30 MMOL/L (ref 22–29)
CREAT CL PREDICTED SERPL C-G-VRATE: 56 ML/MIN (ref 70–130)
EOSINOPHIL # BLD AUTO: 0.2 THOU/UL (ref 0–0.7)
EOSINOPHIL NFR BLD AUTO: 2.8 % (ref 0–10)
GLUCOSE SERPL-MCNC: 93 MG/DL (ref 70–105)
HGB BLD-MCNC: 11.6 G/DL (ref 12–16)
LYMPHOCYTES # BLD: 1.9 THOU/UL (ref 1.2–3.4)
LYMPHOCYTES NFR BLD AUTO: 21.8 % (ref 21–51)
MCH RBC QN AUTO: 28.9 PG (ref 27–31)
MCV RBC AUTO: 90.1 FL (ref 78–98)
MONOCYTES # BLD AUTO: 0.9 THOU/UL (ref 0.11–0.59)
MONOCYTES NFR BLD AUTO: 10.4 % (ref 0–10)
NEUTROPHILS # BLD AUTO: 5.5 THOU/UL (ref 1.4–6.5)
NEUTROPHILS NFR BLD AUTO: 64.3 % (ref 42–75)
PLATELET # BLD AUTO: 269 THOU/UL (ref 130–400)
POTASSIUM SERPL-SCNC: 5 MMOL/L (ref 3.5–5.1)
RBC # BLD AUTO: 4.01 MILL/UL (ref 4.2–5.4)
SODIUM SERPL-SCNC: 134 MMOL/L (ref 136–145)
WBC # BLD AUTO: 8.6 THOU/UL (ref 4.8–10.8)

## 2021-05-10 RX ADMIN — INSULIN LISPRO PRN UNIT: 100 INJECTION, SOLUTION INTRAVENOUS; SUBCUTANEOUS at 11:40

## 2021-05-10 RX ADMIN — ALOGLIPTIN SCH MG: 25 TABLET, FILM COATED ORAL at 09:53

## 2021-05-10 RX ADMIN — Medication SCH ML: at 09:55

## 2021-05-10 RX ADMIN — Medication SCH ML: at 20:35

## 2021-05-11 LAB
ANION GAP SERPL CALC-SCNC: 18 MMOL/L (ref 10–20)
ANISOCYTOSIS BLD QL SMEAR: (no result) (100X)
BASOPHILS # BLD AUTO: 0.1 THOU/UL (ref 0–0.2)
BASOPHILS NFR BLD AUTO: 0.8 % (ref 0–1)
BUN SERPL-MCNC: 31 MG/DL (ref 9.8–20.1)
CALCIUM SERPL-MCNC: 9.4 MG/DL (ref 7.8–10.44)
CHLORIDE SERPL-SCNC: 92 MMOL/L (ref 98–107)
CO2 SERPL-SCNC: 28 MMOL/L (ref 22–29)
CREAT CL PREDICTED SERPL C-G-VRATE: 49 ML/MIN (ref 70–130)
EOSINOPHIL # BLD AUTO: 0.2 THOU/UL (ref 0–0.7)
EOSINOPHIL NFR BLD AUTO: 2.6 % (ref 0–10)
GLUCOSE SERPL-MCNC: 127 MG/DL (ref 70–105)
HGB BLD-MCNC: 12.8 G/DL (ref 12–16)
LYMPHOCYTES # BLD: 1.8 THOU/UL (ref 1.2–3.4)
LYMPHOCYTES NFR BLD AUTO: 19 % (ref 21–51)
MCH RBC QN AUTO: 27.7 PG (ref 27–31)
MCV RBC AUTO: 90.1 FL (ref 78–98)
MDIFF COMPLETE?: YES
MONOCYTES # BLD AUTO: 0.9 THOU/UL (ref 0.11–0.59)
MONOCYTES NFR BLD AUTO: 9.4 % (ref 0–10)
NEUTROPHILS # BLD AUTO: 6.3 THOU/UL (ref 1.4–6.5)
NEUTROPHILS NFR BLD AUTO: 68.2 % (ref 42–75)
PLATELET # BLD AUTO: 305 THOU/UL (ref 130–400)
POTASSIUM SERPL-SCNC: 4.5 MMOL/L (ref 3.5–5.1)
RBC # BLD AUTO: 4.63 MILL/UL (ref 4.2–5.4)
SODIUM SERPL-SCNC: 133 MMOL/L (ref 136–145)
WBC # BLD AUTO: 9.2 THOU/UL (ref 4.8–10.8)

## 2021-05-11 RX ADMIN — Medication SCH ML: at 09:15

## 2021-05-11 RX ADMIN — ALOGLIPTIN SCH MG: 25 TABLET, FILM COATED ORAL at 09:13

## 2021-05-11 RX ADMIN — Medication SCH ML: at 20:26

## 2021-05-11 RX ADMIN — INSULIN LISPRO PRN UNIT: 100 INJECTION, SOLUTION INTRAVENOUS; SUBCUTANEOUS at 11:44

## 2021-05-12 VITALS — SYSTOLIC BLOOD PRESSURE: 139 MMHG | DIASTOLIC BLOOD PRESSURE: 84 MMHG

## 2021-05-12 VITALS — TEMPERATURE: 97.8 F

## 2021-05-12 LAB
ALBUMIN SERPL BCG-MCNC: 3 G/DL (ref 3.5–5)
ANION GAP SERPL CALC-SCNC: 14 MMOL/L (ref 10–20)
BUN SERPL-MCNC: 31 MG/DL (ref 9.8–20.1)
BUN/CREAT SERPL: 22.79
CALCIUM SERPL-MCNC: 9.1 MG/DL (ref 7.8–10.44)
CHLORIDE SERPL-SCNC: 94 MMOL/L (ref 98–107)
CO2 SERPL-SCNC: 29 MMOL/L (ref 22–29)
CREAT CL PREDICTED SERPL C-G-VRATE: 53 ML/MIN (ref 70–130)
GLUCOSE SERPL-MCNC: 90 MG/DL (ref 70–105)
POTASSIUM SERPL-SCNC: 5 MMOL/L (ref 3.5–5.1)
SODIUM SERPL-SCNC: 132 MMOL/L (ref 136–145)

## 2021-05-12 RX ADMIN — Medication SCH ML: at 09:30

## 2021-05-12 RX ADMIN — ALOGLIPTIN SCH MG: 25 TABLET, FILM COATED ORAL at 09:27

## 2021-06-13 ENCOUNTER — HOSPITAL ENCOUNTER (EMERGENCY)
Dept: HOSPITAL 9 - MADERS | Age: 51
Discharge: ANOTHER HOSPITAL | End: 2021-06-13
Payer: OTHER GOVERNMENT

## 2021-06-13 DIAGNOSIS — R06.2: ICD-10-CM

## 2021-06-13 DIAGNOSIS — E78.5: ICD-10-CM

## 2021-06-13 DIAGNOSIS — I25.2: ICD-10-CM

## 2021-06-13 DIAGNOSIS — Z79.899: ICD-10-CM

## 2021-06-13 DIAGNOSIS — E78.00: ICD-10-CM

## 2021-06-13 DIAGNOSIS — Z87.891: ICD-10-CM

## 2021-06-13 DIAGNOSIS — E11.40: ICD-10-CM

## 2021-06-13 DIAGNOSIS — I11.0: Primary | ICD-10-CM

## 2021-06-13 DIAGNOSIS — E78.1: ICD-10-CM

## 2021-06-13 DIAGNOSIS — I50.9: ICD-10-CM

## 2021-06-13 DIAGNOSIS — Z79.84: ICD-10-CM

## 2021-06-13 DIAGNOSIS — Z79.891: ICD-10-CM

## 2021-06-13 DIAGNOSIS — I25.10: ICD-10-CM

## 2021-06-13 LAB
ALBUMIN SERPL BCG-MCNC: 3.3 G/DL (ref 3.5–5)
ALP SERPL-CCNC: 179 U/L (ref 40–110)
ALT SERPL W P-5'-P-CCNC: 18 U/L (ref 8–55)
ANION GAP SERPL CALC-SCNC: 19 MMOL/L (ref 10–20)
ANISOCYTOSIS BLD QL SMEAR: (no result) (100X)
AST SERPL-CCNC: 22 U/L (ref 5–34)
BASOPHILS # BLD AUTO: 0.1 THOU/UL (ref 0–0.2)
BASOPHILS NFR BLD AUTO: 0.6 % (ref 0–1)
BILIRUB SERPL-MCNC: 0.7 MG/DL (ref 0.2–1.2)
BUN SERPL-MCNC: 15 MG/DL (ref 9.8–20.1)
CALCIUM SERPL-MCNC: 8.9 MG/DL (ref 7.8–10.44)
CHLORIDE SERPL-SCNC: 97 MMOL/L (ref 98–107)
CK SERPL-CCNC: 58 U/L (ref 29–168)
CO2 SERPL-SCNC: 24 MMOL/L (ref 22–29)
CREAT CL PREDICTED SERPL C-G-VRATE: 0 ML/MIN (ref 70–130)
EOSINOPHIL # BLD AUTO: 0.2 THOU/UL (ref 0–0.7)
EOSINOPHIL NFR BLD AUTO: 2.4 % (ref 0–10)
GLOBULIN SER CALC-MCNC: 4.2 G/DL (ref 2.4–3.5)
GLUCOSE SERPL-MCNC: 177 MG/DL (ref 70–105)
HGB BLD-MCNC: 12.1 G/DL (ref 12–16)
LIPASE SERPL-CCNC: 72 U/L (ref 8–78)
LYMPHOCYTES # BLD AUTO: 1.1 THOU/UL (ref 1.2–3.4)
LYMPHOCYTES NFR BLD AUTO: 13 % (ref 21–51)
MCH RBC QN AUTO: 28.2 PG (ref 27–31)
MCV RBC AUTO: 90.8 FL (ref 78–98)
MDIFF COMPLETE?: YES
MONOCYTES # BLD AUTO: 0.8 THOU/UL (ref 0.11–0.59)
MONOCYTES NFR BLD AUTO: 9 % (ref 0–10)
NEUTROPHILS # BLD AUTO: 6.3 THOU/UL (ref 1.4–6.5)
NEUTROPHILS NFR BLD AUTO: 75 % (ref 42–75)
PLATELET # BLD AUTO: 251 THOU/UL (ref 130–400)
POTASSIUM SERPL-SCNC: 4.1 MMOL/L (ref 3.5–5.1)
RBC # BLD AUTO: 4.31 MILL/UL (ref 4.2–5.4)
SODIUM SERPL-SCNC: 136 MMOL/L (ref 136–145)
WBC # BLD AUTO: 8.4 THOU/UL (ref 4.8–10.8)

## 2021-06-13 PROCEDURE — 83690 ASSAY OF LIPASE: CPT

## 2021-06-13 PROCEDURE — 85025 COMPLETE CBC W/AUTO DIFF WBC: CPT

## 2021-06-13 PROCEDURE — 83880 ASSAY OF NATRIURETIC PEPTIDE: CPT

## 2021-06-13 PROCEDURE — 80053 COMPREHEN METABOLIC PANEL: CPT

## 2021-06-13 PROCEDURE — 96374 THER/PROPH/DIAG INJ IV PUSH: CPT

## 2021-06-13 PROCEDURE — 93005 ELECTROCARDIOGRAM TRACING: CPT

## 2021-06-13 PROCEDURE — 71045 X-RAY EXAM CHEST 1 VIEW: CPT

## 2021-06-13 PROCEDURE — 84484 ASSAY OF TROPONIN QUANT: CPT

## 2021-06-13 PROCEDURE — 94760 N-INVAS EAR/PLS OXIMETRY 1: CPT

## 2021-06-13 PROCEDURE — 82550 ASSAY OF CK (CPK): CPT

## 2021-06-28 ENCOUNTER — HOSPITAL ENCOUNTER (OUTPATIENT)
Dept: HOSPITAL 9 - MADLAB | Age: 51
Discharge: HOME | End: 2021-06-28
Payer: OTHER GOVERNMENT

## 2021-06-28 DIAGNOSIS — N17.9: Primary | ICD-10-CM

## 2021-06-28 LAB
ALBUMIN SERPL BCG-MCNC: 3.4 G/DL (ref 3.5–5)
ANION GAP SERPL CALC-SCNC: 17 MMOL/L (ref 10–20)
BUN SERPL-MCNC: 19 MG/DL (ref 9.8–20.1)
BUN/CREAT SERPL: 13.19
CALCIUM SERPL-MCNC: 9.2 MG/DL (ref 7.8–10.44)
CHLORIDE SERPL-SCNC: 96 MMOL/L (ref 98–107)
CO2 SERPL-SCNC: 27 MMOL/L (ref 22–29)
CREAT CL PREDICTED SERPL C-G-VRATE: 0 ML/MIN (ref 70–130)
GLUCOSE SERPL-MCNC: 237 MG/DL (ref 70–105)
POTASSIUM SERPL-SCNC: 4.5 MMOL/L (ref 3.5–5.1)
SODIUM SERPL-SCNC: 135 MMOL/L (ref 136–145)

## 2021-06-28 PROCEDURE — 80069 RENAL FUNCTION PANEL: CPT

## 2021-06-28 PROCEDURE — 36415 COLL VENOUS BLD VENIPUNCTURE: CPT

## 2021-07-03 ENCOUNTER — HOSPITAL ENCOUNTER (EMERGENCY)
Dept: HOSPITAL 9 - MADERS | Age: 51
Discharge: ANOTHER HOSPITAL | End: 2021-07-03
Payer: OTHER GOVERNMENT

## 2021-07-03 DIAGNOSIS — E11.40: ICD-10-CM

## 2021-07-03 DIAGNOSIS — I25.2: ICD-10-CM

## 2021-07-03 DIAGNOSIS — Z79.84: ICD-10-CM

## 2021-07-03 DIAGNOSIS — L03.116: ICD-10-CM

## 2021-07-03 DIAGNOSIS — I25.10: ICD-10-CM

## 2021-07-03 DIAGNOSIS — Z79.891: ICD-10-CM

## 2021-07-03 DIAGNOSIS — I10: ICD-10-CM

## 2021-07-03 DIAGNOSIS — E78.00: ICD-10-CM

## 2021-07-03 DIAGNOSIS — Z87.891: ICD-10-CM

## 2021-07-03 DIAGNOSIS — L97.529: ICD-10-CM

## 2021-07-03 DIAGNOSIS — M86.9: Primary | ICD-10-CM

## 2021-07-03 DIAGNOSIS — E78.5: ICD-10-CM

## 2021-07-03 DIAGNOSIS — E78.1: ICD-10-CM

## 2021-07-03 DIAGNOSIS — Z79.899: ICD-10-CM

## 2021-07-03 LAB
ALBUMIN SERPL BCG-MCNC: 2.5 G/DL (ref 3.5–5)
ALP SERPL-CCNC: 214 U/L (ref 40–110)
ALT SERPL W P-5'-P-CCNC: 12 U/L (ref 8–55)
ANION GAP SERPL CALC-SCNC: 17 MMOL/L (ref 10–20)
AST SERPL-CCNC: 16 U/L (ref 5–34)
BILIRUB SERPL-MCNC: 0.5 MG/DL (ref 0.2–1.2)
BUN SERPL-MCNC: 23 MG/DL (ref 9.8–20.1)
CALCIUM SERPL-MCNC: 8.8 MG/DL (ref 7.8–10.44)
CHLORIDE SERPL-SCNC: 91 MMOL/L (ref 98–107)
CO2 SERPL-SCNC: 25 MMOL/L (ref 22–29)
CREAT CL PREDICTED SERPL C-G-VRATE: 0 ML/MIN (ref 70–130)
GLOBULIN SER CALC-MCNC: 4.3 G/DL (ref 2.4–3.5)
GLUCOSE SERPL-MCNC: 283 MG/DL (ref 70–105)
HGB BLD-MCNC: 9.4 G/DL (ref 12–16)
MCH RBC QN AUTO: 27.6 PG (ref 27–31)
MCV RBC AUTO: 89.6 FL (ref 78–98)
MDIFF COMPLETE?: YES
PLATELET # BLD AUTO: 323 THOU/UL (ref 130–400)
POTASSIUM SERPL-SCNC: 4.8 MMOL/L (ref 3.5–5.1)
RBC # BLD AUTO: 3.42 MILL/UL (ref 4.2–5.4)
SODIUM SERPL-SCNC: 128 MMOL/L (ref 136–145)
WBC # BLD AUTO: 26.4 THOU/UL (ref 4.8–10.8)

## 2021-07-03 PROCEDURE — 80053 COMPREHEN METABOLIC PANEL: CPT

## 2021-07-03 PROCEDURE — 86140 C-REACTIVE PROTEIN: CPT

## 2021-07-03 PROCEDURE — 85025 COMPLETE CBC W/AUTO DIFF WBC: CPT

## 2021-07-03 PROCEDURE — 96365 THER/PROPH/DIAG IV INF INIT: CPT

## 2021-07-03 PROCEDURE — 96375 TX/PRO/DX INJ NEW DRUG ADDON: CPT

## 2021-07-10 ENCOUNTER — HOSPITAL ENCOUNTER (INPATIENT)
Dept: HOSPITAL 9 - MADMS | Age: 51
LOS: 3 days | Discharge: ANOTHER HOSPITAL | DRG: 638 | End: 2021-07-13
Attending: FAMILY MEDICINE | Admitting: FAMILY MEDICINE
Payer: OTHER GOVERNMENT

## 2021-07-10 VITALS — BODY MASS INDEX: 22.3 KG/M2

## 2021-07-10 DIAGNOSIS — Z89.412: ICD-10-CM

## 2021-07-10 DIAGNOSIS — Z88.5: ICD-10-CM

## 2021-07-10 DIAGNOSIS — E11.51: ICD-10-CM

## 2021-07-10 DIAGNOSIS — Z87.891: ICD-10-CM

## 2021-07-10 DIAGNOSIS — E11.69: Primary | ICD-10-CM

## 2021-07-10 DIAGNOSIS — Z88.8: ICD-10-CM

## 2021-07-10 DIAGNOSIS — R53.81: ICD-10-CM

## 2021-07-10 DIAGNOSIS — Z95.1: ICD-10-CM

## 2021-07-10 DIAGNOSIS — Z79.899: ICD-10-CM

## 2021-07-10 DIAGNOSIS — Z79.4: ICD-10-CM

## 2021-07-10 DIAGNOSIS — E11.65: ICD-10-CM

## 2021-07-10 DIAGNOSIS — I13.0: ICD-10-CM

## 2021-07-10 DIAGNOSIS — E11.22: ICD-10-CM

## 2021-07-10 DIAGNOSIS — I25.10: ICD-10-CM

## 2021-07-10 DIAGNOSIS — N18.32: ICD-10-CM

## 2021-07-10 DIAGNOSIS — M86.162: ICD-10-CM

## 2021-07-10 DIAGNOSIS — E78.5: ICD-10-CM

## 2021-07-10 DIAGNOSIS — I50.32: ICD-10-CM

## 2021-07-10 PROCEDURE — 36416 COLLJ CAPILLARY BLOOD SPEC: CPT

## 2021-07-10 PROCEDURE — 84300 ASSAY OF URINE SODIUM: CPT

## 2021-07-10 PROCEDURE — 36415 COLL VENOUS BLD VENIPUNCTURE: CPT

## 2021-07-10 PROCEDURE — 85025 COMPLETE CBC W/AUTO DIFF WBC: CPT

## 2021-07-10 PROCEDURE — 82570 ASSAY OF URINE CREATININE: CPT

## 2021-07-10 PROCEDURE — 80048 BASIC METABOLIC PNL TOTAL CA: CPT

## 2021-07-10 PROCEDURE — 97602 WOUND(S) CARE NON-SELECTIVE: CPT

## 2021-07-10 PROCEDURE — 81001 URINALYSIS AUTO W/SCOPE: CPT

## 2021-07-10 RX ADMIN — BRIMONIDINE TARTRATE SCH DROP: 2 SOLUTION/ DROPS OPHTHALMIC at 20:24

## 2021-07-10 RX ADMIN — INSULIN LISPRO PRN UNIT: 100 INJECTION, SOLUTION INTRAVENOUS; SUBCUTANEOUS at 18:24

## 2021-07-11 LAB
ANION GAP SERPL CALC-SCNC: 15 MMOL/L (ref 10–20)
BACTERIA UR QL AUTO: (no result) HPF
BASOPHILS # BLD AUTO: 0.1 THOU/UL (ref 0–0.2)
BASOPHILS NFR BLD AUTO: 0.3 % (ref 0–1)
BUN SERPL-MCNC: 25 MG/DL (ref 9.8–20.1)
CALCIUM SERPL-MCNC: 8.7 MG/DL (ref 7.8–10.44)
CHLORIDE SERPL-SCNC: 94 MMOL/L (ref 98–107)
CO2 SERPL-SCNC: 26 MMOL/L (ref 22–29)
CREAT CL PREDICTED SERPL C-G-VRATE: 49 ML/MIN (ref 70–130)
EOSINOPHIL # BLD AUTO: 0.2 THOU/UL (ref 0–0.7)
EOSINOPHIL NFR BLD AUTO: 0.5 % (ref 0–10)
GLUCOSE SERPL-MCNC: 195 MG/DL (ref 70–105)
HGB BLD-MCNC: 11.2 G/DL (ref 12–16)
LYMPHOCYTES # BLD AUTO: 1.7 THOU/UL (ref 1.2–3.4)
LYMPHOCYTES NFR BLD AUTO: 4.5 % (ref 21–51)
MCH RBC QN AUTO: 28.1 PG (ref 27–31)
MCV RBC AUTO: 87.3 FL (ref 78–98)
MONOCYTES # BLD AUTO: 2.4 THOU/UL (ref 0.11–0.59)
MONOCYTES NFR BLD AUTO: 6.4 % (ref 0–10)
NEUTROPHILS # BLD AUTO: 33.3 THOU/UL (ref 1.4–6.5)
NEUTROPHILS NFR BLD AUTO: 88.4 % (ref 42–75)
PLATELET # BLD AUTO: 382 THOU/UL (ref 130–400)
POTASSIUM SERPL-SCNC: 4.9 MMOL/L (ref 3.5–5.1)
PROT UR STRIP.AUTO-MCNC: 100 MG/DL
RBC # BLD AUTO: 3.97 MILL/UL (ref 4.2–5.4)
RBC UR QL AUTO: (no result) HPF (ref 0–3)
SODIUM SERPL-SCNC: 130 MMOL/L (ref 136–145)
SP GR UR STRIP: 1.01 (ref 1–1.03)
WBC # BLD AUTO: 37.6 THOU/UL (ref 4.8–10.8)
WBC UR QL AUTO: (no result) HPF (ref 0–3)

## 2021-07-11 RX ADMIN — INSULIN LISPRO PRN UNIT: 100 INJECTION, SOLUTION INTRAVENOUS; SUBCUTANEOUS at 12:45

## 2021-07-11 RX ADMIN — BRIMONIDINE TARTRATE SCH DROP: 2 SOLUTION/ DROPS OPHTHALMIC at 09:12

## 2021-07-11 RX ADMIN — ALOGLIPTIN SCH: 25 TABLET, FILM COATED ORAL at 09:54

## 2021-07-11 RX ADMIN — INSULIN LISPRO PRN UNIT: 100 INJECTION, SOLUTION INTRAVENOUS; SUBCUTANEOUS at 17:36

## 2021-07-11 RX ADMIN — INSULIN LISPRO PRN UNIT: 100 INJECTION, SOLUTION INTRAVENOUS; SUBCUTANEOUS at 09:15

## 2021-07-11 RX ADMIN — BRIMONIDINE TARTRATE SCH DROP: 2 SOLUTION/ DROPS OPHTHALMIC at 20:31

## 2021-07-12 LAB
BASOPHILS # BLD AUTO: 0.1 THOU/UL (ref 0–0.2)
BASOPHILS NFR BLD AUTO: 0.4 % (ref 0–1)
EOSINOPHIL # BLD AUTO: 0.1 THOU/UL (ref 0–0.7)
EOSINOPHIL NFR BLD AUTO: 0.3 % (ref 0–10)
HGB BLD-MCNC: 10.6 G/DL (ref 12–16)
LYMPHOCYTES # BLD AUTO: 1.8 THOU/UL (ref 1.2–3.4)
LYMPHOCYTES NFR BLD AUTO: 5.4 % (ref 21–51)
MCH RBC QN AUTO: 27.1 PG (ref 27–31)
MCV RBC AUTO: 87.9 FL (ref 78–98)
MONOCYTES # BLD AUTO: 1.8 THOU/UL (ref 0.11–0.59)
MONOCYTES NFR BLD AUTO: 5.3 % (ref 0–10)
NEUTROPHILS # BLD AUTO: 30.1 THOU/UL (ref 1.4–6.5)
NEUTROPHILS NFR BLD AUTO: 88.7 % (ref 42–75)
PLATELET # BLD AUTO: 421 THOU/UL (ref 130–400)
RBC # BLD AUTO: 3.92 MILL/UL (ref 4.2–5.4)
SODIUM UR-SCNC: 25 MMOL/L
WBC # BLD AUTO: 33.9 THOU/UL (ref 4.8–10.8)

## 2021-07-12 RX ADMIN — BRIMONIDINE TARTRATE SCH DROP: 2 SOLUTION/ DROPS OPHTHALMIC at 08:23

## 2021-07-12 RX ADMIN — ALOGLIPTIN SCH MG: 25 TABLET, FILM COATED ORAL at 09:51

## 2021-07-12 RX ADMIN — INSULIN LISPRO PRN UNIT: 100 INJECTION, SOLUTION INTRAVENOUS; SUBCUTANEOUS at 17:19

## 2021-07-12 RX ADMIN — INSULIN LISPRO PRN UNIT: 100 INJECTION, SOLUTION INTRAVENOUS; SUBCUTANEOUS at 12:04

## 2021-07-12 RX ADMIN — BRIMONIDINE TARTRATE SCH DROP: 2 SOLUTION/ DROPS OPHTHALMIC at 20:02

## 2021-07-12 RX ADMIN — INSULIN LISPRO PRN UNIT: 100 INJECTION, SOLUTION INTRAVENOUS; SUBCUTANEOUS at 08:19

## 2021-07-13 VITALS — SYSTOLIC BLOOD PRESSURE: 128 MMHG | DIASTOLIC BLOOD PRESSURE: 71 MMHG | TEMPERATURE: 99.4 F

## 2021-07-13 LAB
ANION GAP SERPL CALC-SCNC: 17 MMOL/L (ref 10–20)
BASOPHILS # BLD AUTO: 0.1 THOU/UL (ref 0–0.2)
BASOPHILS NFR BLD AUTO: 0.3 % (ref 0–1)
BUN SERPL-MCNC: 25 MG/DL (ref 9.8–20.1)
CALCIUM SERPL-MCNC: 8.2 MG/DL (ref 7.8–10.44)
CHLORIDE SERPL-SCNC: 90 MMOL/L (ref 98–107)
CO2 SERPL-SCNC: 23 MMOL/L (ref 22–29)
CREAT CL PREDICTED SERPL C-G-VRATE: 43 ML/MIN (ref 70–130)
EOSINOPHIL # BLD AUTO: 0 THOU/UL (ref 0–0.7)
EOSINOPHIL NFR BLD AUTO: 0.1 % (ref 0–10)
GLUCOSE SERPL-MCNC: 258 MG/DL (ref 70–105)
HGB BLD-MCNC: 9.7 G/DL (ref 12–16)
LYMPHOCYTES # BLD AUTO: 1 THOU/UL (ref 1.2–3.4)
LYMPHOCYTES NFR BLD AUTO: 2.4 % (ref 21–51)
MCH RBC QN AUTO: 27.4 PG (ref 27–31)
MCV RBC AUTO: 88.9 FL (ref 78–98)
MONOCYTES # BLD AUTO: 1.3 THOU/UL (ref 0.11–0.59)
MONOCYTES NFR BLD AUTO: 3.4 % (ref 0–10)
NEUTROPHILS # BLD AUTO: 36.6 THOU/UL (ref 1.4–6.5)
NEUTROPHILS NFR BLD AUTO: 93.8 % (ref 42–75)
PLATELET # BLD AUTO: 362 THOU/UL (ref 130–400)
POTASSIUM SERPL-SCNC: 5.1 MMOL/L (ref 3.5–5.1)
RBC # BLD AUTO: 3.55 MILL/UL (ref 4.2–5.4)
SODIUM SERPL-SCNC: 125 MMOL/L (ref 136–145)
WBC # BLD AUTO: 39 THOU/UL (ref 4.8–10.8)

## 2021-07-13 RX ADMIN — BRIMONIDINE TARTRATE SCH DROP: 2 SOLUTION/ DROPS OPHTHALMIC at 08:43

## 2021-09-28 ENCOUNTER — HOSPITAL ENCOUNTER (EMERGENCY)
Dept: HOSPITAL 9 - MADERS | Age: 51
LOS: 1 days | Discharge: ANOTHER HOSPITAL | End: 2021-09-29
Payer: OTHER GOVERNMENT

## 2021-09-28 DIAGNOSIS — I25.10: ICD-10-CM

## 2021-09-28 DIAGNOSIS — R41.82: ICD-10-CM

## 2021-09-28 DIAGNOSIS — Z79.899: ICD-10-CM

## 2021-09-28 DIAGNOSIS — E78.00: ICD-10-CM

## 2021-09-28 DIAGNOSIS — Z20.822: ICD-10-CM

## 2021-09-28 DIAGNOSIS — R50.9: Primary | ICD-10-CM

## 2021-09-28 DIAGNOSIS — E11.40: ICD-10-CM

## 2021-09-28 DIAGNOSIS — Z79.891: ICD-10-CM

## 2021-09-28 DIAGNOSIS — I25.2: ICD-10-CM

## 2021-09-28 DIAGNOSIS — I48.91: ICD-10-CM

## 2021-09-28 DIAGNOSIS — E78.5: ICD-10-CM

## 2021-09-28 DIAGNOSIS — E87.2: ICD-10-CM

## 2021-09-28 LAB
ALBUMIN SERPL BCG-MCNC: 3.5 G/DL (ref 3.5–5)
ALP SERPL-CCNC: 109 U/L (ref 40–110)
ALT SERPL W P-5'-P-CCNC: 32 U/L (ref 8–55)
ANION GAP SERPL CALC-SCNC: 21 MMOL/L (ref 10–20)
AST SERPL-CCNC: 22 U/L (ref 5–34)
BASOPHILS # BLD AUTO: 0.1 THOU/UL (ref 0–0.2)
BASOPHILS NFR BLD AUTO: 0.6 % (ref 0–1)
BILIRUB SERPL-MCNC: 0.4 MG/DL (ref 0.2–1.2)
BUN SERPL-MCNC: 28 MG/DL (ref 9.8–20.1)
CA-I BLDV-SCNC: 1.06 MMOL/L (ref 1.15–1.33)
CALCIUM SERPL-MCNC: 9.4 MG/DL (ref 7.8–10.44)
CHLORIDE BLDV-SCNC: 96 MMOL/L (ref 98–107)
CHLORIDE SERPL-SCNC: 100 MMOL/L (ref 98–107)
CK MB SERPL-MCNC: 2.1 NG/ML (ref 0–6.6)
CK SERPL-CCNC: 131 U/L (ref 29–168)
CO2 BLDV CALC-SCNC: 29.6 MMOL/L (ref 22–28)
CO2 SERPL-SCNC: 22 MMOL/L (ref 22–29)
CREAT CL PREDICTED SERPL C-G-VRATE: 0 ML/MIN (ref 70–130)
EOSINOPHIL # BLD AUTO: 0 THOU/UL (ref 0–0.7)
EOSINOPHIL NFR BLD AUTO: 0.1 % (ref 0–10)
GLOBULIN SER CALC-MCNC: 3.4 G/DL (ref 2.4–3.5)
GLUCOSE SERPL-MCNC: 492 MG/DL (ref 70–105)
GLUCOSE UR STRIP-MCNC: 500 MG/DL
HCO3 BLDV-SCNC: 28 MMOL/L (ref 22–28)
HCT VFR BLD CALC: 58 % (ref 36–47)
HGB BLD CALC-MCNC: 19.6 G/DL (ref 12–16)
HGB BLD-MCNC: 16.9 G/DL (ref 12–16)
LYMPHOCYTES # BLD AUTO: 1.2 THOU/UL (ref 1.2–3.4)
LYMPHOCYTES NFR BLD AUTO: 9.8 % (ref 21–51)
MAGNESIUM SERPL-MCNC: 1.5 MG/DL (ref 1.6–2.6)
MCH RBC QN AUTO: 28.1 PG (ref 27–31)
MCV RBC AUTO: 92.8 FL (ref 78–98)
MONOCYTES # BLD AUTO: 0.5 THOU/UL (ref 0.11–0.59)
MONOCYTES NFR BLD AUTO: 4 % (ref 0–10)
NEUTROPHILS # BLD AUTO: 10.5 THOU/UL (ref 1.4–6.5)
NEUTROPHILS NFR BLD AUTO: 85.5 % (ref 42–75)
PCO2 BLDV: 51.1 MMHG (ref 42–51)
PLATELET # BLD AUTO: 243 THOU/UL (ref 130–400)
POTASSIUM BLDV-SCNC: 4.8 MMOL/L (ref 3.5–5.1)
POTASSIUM SERPL-SCNC: 3.9 MMOL/L (ref 3.5–5.1)
PROT UR STRIP.AUTO-MCNC: (no result) MG/DL
RBC # BLD AUTO: 6.03 MILL/UL (ref 4.2–5.4)
SAO2 % BLDV FROM PO2: 51.7 % (ref 60–85)
SODIUM BLDV-SCNC: 134 MMOL/L (ref 138–145)
SODIUM SERPL-SCNC: 139 MMOL/L (ref 136–145)
SP GR UR STRIP: 1.02 (ref 1–1.03)
WBC # BLD AUTO: 12.3 THOU/UL (ref 4.8–10.8)
WBC UR QL AUTO: (no result) HPF (ref 0–3)

## 2021-09-28 PROCEDURE — 96375 TX/PRO/DX INJ NEW DRUG ADDON: CPT

## 2021-09-28 PROCEDURE — U0002 COVID-19 LAB TEST NON-CDC: HCPCS

## 2021-09-28 PROCEDURE — 83880 ASSAY OF NATRIURETIC PEPTIDE: CPT

## 2021-09-28 PROCEDURE — 81003 URINALYSIS AUTO W/O SCOPE: CPT

## 2021-09-28 PROCEDURE — 93005 ELECTROCARDIOGRAM TRACING: CPT

## 2021-09-28 PROCEDURE — 82330 ASSAY OF CALCIUM: CPT

## 2021-09-28 PROCEDURE — 74177 CT ABD & PELVIS W/CONTRAST: CPT

## 2021-09-28 PROCEDURE — 71045 X-RAY EXAM CHEST 1 VIEW: CPT

## 2021-09-28 PROCEDURE — 82553 CREATINE MB FRACTION: CPT

## 2021-09-28 PROCEDURE — 84157 ASSAY OF PROTEIN OTHER: CPT

## 2021-09-28 PROCEDURE — 62270 DX LMBR SPI PNXR: CPT

## 2021-09-28 PROCEDURE — 83735 ASSAY OF MAGNESIUM: CPT

## 2021-09-28 PROCEDURE — 87070 CULTURE OTHR SPECIMN AEROBIC: CPT

## 2021-09-28 PROCEDURE — 87040 BLOOD CULTURE FOR BACTERIA: CPT

## 2021-09-28 PROCEDURE — 80053 COMPREHEN METABOLIC PANEL: CPT

## 2021-09-28 PROCEDURE — 83605 ASSAY OF LACTIC ACID: CPT

## 2021-09-28 PROCEDURE — 85014 HEMATOCRIT: CPT

## 2021-09-28 PROCEDURE — 89051 BODY FLUID CELL COUNT: CPT

## 2021-09-28 PROCEDURE — 96367 TX/PROPH/DG ADDL SEQ IV INF: CPT

## 2021-09-28 PROCEDURE — 84484 ASSAY OF TROPONIN QUANT: CPT

## 2021-09-28 PROCEDURE — 87086 URINE CULTURE/COLONY COUNT: CPT

## 2021-09-28 PROCEDURE — 70450 CT HEAD/BRAIN W/O DYE: CPT

## 2021-09-28 PROCEDURE — 96365 THER/PROPH/DIAG IV INF INIT: CPT

## 2021-09-28 PROCEDURE — 82803 BLOOD GASES ANY COMBINATION: CPT

## 2021-09-28 PROCEDURE — 36416 COLLJ CAPILLARY BLOOD SPEC: CPT

## 2021-09-28 PROCEDURE — 81015 MICROSCOPIC EXAM OF URINE: CPT

## 2021-09-28 PROCEDURE — 82550 ASSAY OF CK (CPK): CPT

## 2021-09-28 PROCEDURE — 87205 SMEAR GRAM STAIN: CPT

## 2021-09-28 PROCEDURE — 82945 GLUCOSE OTHER FLUID: CPT

## 2021-09-28 PROCEDURE — 51702 INSERT TEMP BLADDER CATH: CPT

## 2021-09-28 PROCEDURE — 96372 THER/PROPH/DIAG INJ SC/IM: CPT

## 2021-09-28 PROCEDURE — 85025 COMPLETE CBC W/AUTO DIFF WBC: CPT

## 2021-09-28 PROCEDURE — 96376 TX/PRO/DX INJ SAME DRUG ADON: CPT

## 2021-09-29 LAB
COLOR CSF: COLORLESS
COLOR CSF: COLORLESS
GLUCOSE CSF-MCNC: 190 MG/DL (ref 40–70)
PROT CSF-MCNC: 53 MG/DL (ref 15–40)
SPECIMEN SOURCE FLD: (no result)
SPECIMEN SOURCE FLD: (no result)
TROPONIN I SERPL DL<=0.01 NG/ML-MCNC: 3.33 NG/ML (ref ?–0.03)

## 2021-12-06 ENCOUNTER — HOSPITAL ENCOUNTER (INPATIENT)
Dept: HOSPITAL 92 - ERS | Age: 51
LOS: 1 days | Discharge: HOME | DRG: 637 | End: 2021-12-07
Attending: INTERNAL MEDICINE | Admitting: STUDENT IN AN ORGANIZED HEALTH CARE EDUCATION/TRAINING PROGRAM
Payer: OTHER GOVERNMENT

## 2021-12-06 ENCOUNTER — HOSPITAL ENCOUNTER (EMERGENCY)
Dept: HOSPITAL 9 - MADERS | Age: 51
Discharge: ANOTHER HOSPITAL | End: 2021-12-06
Payer: OTHER GOVERNMENT

## 2021-12-06 VITALS — BODY MASS INDEX: 19.7 KG/M2

## 2021-12-06 DIAGNOSIS — E78.00: ICD-10-CM

## 2021-12-06 DIAGNOSIS — E11.65: ICD-10-CM

## 2021-12-06 DIAGNOSIS — I10: ICD-10-CM

## 2021-12-06 DIAGNOSIS — E11.51: ICD-10-CM

## 2021-12-06 DIAGNOSIS — E11.40: ICD-10-CM

## 2021-12-06 DIAGNOSIS — R74.02: ICD-10-CM

## 2021-12-06 DIAGNOSIS — Z79.899: ICD-10-CM

## 2021-12-06 DIAGNOSIS — Z87.19: ICD-10-CM

## 2021-12-06 DIAGNOSIS — F12.10: ICD-10-CM

## 2021-12-06 DIAGNOSIS — F41.8: ICD-10-CM

## 2021-12-06 DIAGNOSIS — I25.10: ICD-10-CM

## 2021-12-06 DIAGNOSIS — I24.8: ICD-10-CM

## 2021-12-06 DIAGNOSIS — Z88.5: ICD-10-CM

## 2021-12-06 DIAGNOSIS — N39.0: ICD-10-CM

## 2021-12-06 DIAGNOSIS — E11.22: ICD-10-CM

## 2021-12-06 DIAGNOSIS — G93.41: ICD-10-CM

## 2021-12-06 DIAGNOSIS — Z87.891: ICD-10-CM

## 2021-12-06 DIAGNOSIS — I25.2: ICD-10-CM

## 2021-12-06 DIAGNOSIS — I50.32: ICD-10-CM

## 2021-12-06 DIAGNOSIS — E78.2: ICD-10-CM

## 2021-12-06 DIAGNOSIS — N17.9: ICD-10-CM

## 2021-12-06 DIAGNOSIS — R41.82: Primary | ICD-10-CM

## 2021-12-06 DIAGNOSIS — B18.2: ICD-10-CM

## 2021-12-06 DIAGNOSIS — Z23: ICD-10-CM

## 2021-12-06 DIAGNOSIS — I13.0: ICD-10-CM

## 2021-12-06 DIAGNOSIS — N18.32: ICD-10-CM

## 2021-12-06 DIAGNOSIS — E78.5: ICD-10-CM

## 2021-12-06 DIAGNOSIS — I49.3: ICD-10-CM

## 2021-12-06 DIAGNOSIS — Z89.612: ICD-10-CM

## 2021-12-06 DIAGNOSIS — Z20.822: ICD-10-CM

## 2021-12-06 DIAGNOSIS — I08.3: ICD-10-CM

## 2021-12-06 DIAGNOSIS — H40.9: ICD-10-CM

## 2021-12-06 DIAGNOSIS — I48.91: ICD-10-CM

## 2021-12-06 DIAGNOSIS — Z79.4: ICD-10-CM

## 2021-12-06 DIAGNOSIS — Z88.8: ICD-10-CM

## 2021-12-06 DIAGNOSIS — E11.10: Primary | ICD-10-CM

## 2021-12-06 DIAGNOSIS — I21.4: ICD-10-CM

## 2021-12-06 DIAGNOSIS — Z79.02: ICD-10-CM

## 2021-12-06 LAB
ALBUMIN SERPL BCG-MCNC: 4.3 G/DL (ref 3.5–5)
ALP SERPL-CCNC: 104 U/L (ref 40–110)
ALT SERPL W P-5'-P-CCNC: 26 U/L (ref 8–55)
ANION GAP SERPL CALC-SCNC: 15 MMOL/L (ref 10–20)
ANION GAP SERPL CALC-SCNC: 27 MMOL/L (ref 10–20)
APAP SERPL-MCNC: (no result) MCG/ML (ref 10–30)
APTT PPP: 27.4 SEC (ref 22.9–36.1)
AST SERPL-CCNC: 26 U/L (ref 5–34)
BACTERIA UR QL AUTO: (no result) HPF
BILIRUB SERPL-MCNC: 1 MG/DL (ref 0.2–1.2)
BUN SERPL-MCNC: 30 MG/DL (ref 9.8–20.1)
BUN SERPL-MCNC: 30 MG/DL (ref 9.8–20.1)
CA-I BLDV-SCNC: 1.06 MMOL/L (ref 1.15–1.33)
CALCIUM SERPL-MCNC: 10.2 MG/DL (ref 7.8–10.44)
CALCIUM SERPL-MCNC: 8.7 MG/DL (ref 7.8–10.44)
CHD RISK SERPL-RTO: 3.5 (ref ?–4.5)
CHLORIDE BLDV-SCNC: 101 MMOL/L (ref 98–107)
CHLORIDE SERPL-SCNC: 104 MMOL/L (ref 98–107)
CHLORIDE SERPL-SCNC: 97 MMOL/L (ref 98–107)
CHOLEST SERPL-MCNC: 187 MG/DL
CK MB SERPL-MCNC: 7.3 NG/ML (ref 0–6.6)
CK SERPL-CCNC: 207 U/L (ref 29–168)
CO2 BLDV CALC-SCNC: 24.1 MMOL/L (ref 22–28)
CO2 SERPL-SCNC: 18 MMOL/L (ref 22–29)
CO2 SERPL-SCNC: 22 MMOL/L (ref 22–29)
CREAT CL PREDICTED SERPL C-G-VRATE: 0 ML/MIN (ref 70–130)
CREAT CL PREDICTED SERPL C-G-VRATE: 0 ML/MIN (ref 70–130)
DRUG SCREEN CUTOFF: (no result)
GLOBULIN SER CALC-MCNC: 4.2 G/DL (ref 2.4–3.5)
GLUCOSE SERPL-MCNC: 325 MG/DL (ref 70–105)
GLUCOSE SERPL-MCNC: 396 MG/DL (ref 70–105)
GLUCOSE UR STRIP-MCNC: >=1000 MG/DL
HCO3 BLDV-SCNC: 23.1 MMOL/L (ref 22–28)
HCT VFR BLD CALC: 55 % (ref 36–47)
HDLC SERPL-MCNC: 53 MG/DL
HGB BLD CALC-MCNC: 18.6 G/DL (ref 12–16)
HGB BLD-MCNC: 17 G/DL (ref 12–16)
INR PPP: 0.9
LDLC SERPL CALC-MCNC: 89 MG/DL
LIPASE SERPL-CCNC: 114 U/L (ref 8–78)
MAGNESIUM SERPL-MCNC: 1.6 MG/DL (ref 1.6–2.6)
MCH RBC QN AUTO: 30.4 PG (ref 27–31)
MCV RBC AUTO: 91.7 FL (ref 78–98)
MDIFF COMPLETE?: YES
MEDTOX CONTROL LINE VALID?: (no result)
PCO2 BLDV: 32.7 MMHG (ref 42–51)
PLATELET # BLD AUTO: 304 THOU/UL (ref 130–400)
POTASSIUM BLDV-SCNC: 4 MMOL/L (ref 3.5–5.1)
POTASSIUM SERPL-SCNC: 3.2 MMOL/L (ref 3.5–5.1)
POTASSIUM SERPL-SCNC: 3.9 MMOL/L (ref 3.5–5.1)
PREGS CONTROL BACKGROUND?: (no result)
PREGS CONTROL BAR APPEAR?: YES
PROT UR STRIP.AUTO-MCNC: (no result) MG/DL
PROTHROMBIN TIME: 12.4 SEC (ref 12–14.7)
RBC # BLD AUTO: 5.58 MILL/UL (ref 4.2–5.4)
SALICYLATES SERPL-MCNC: (no result) MG/DL (ref 15–30)
SAO2 % BLDV FROM PO2: 88.1 % (ref 60–85)
SODIUM BLDV-SCNC: 138 MMOL/L (ref 138–145)
SODIUM SERPL-SCNC: 138 MMOL/L (ref 136–145)
SODIUM SERPL-SCNC: 138 MMOL/L (ref 136–145)
SP GR UR STRIP: 1.02 (ref 1–1.03)
TRIGL SERPL-MCNC: 227 MG/DL (ref ?–150)
TROPONIN I SERPL DL<=0.01 NG/ML-MCNC: 2.21 NG/ML (ref ?–0.03)
TROPONIN I SERPL DL<=0.01 NG/ML-MCNC: 2.97 NG/ML (ref ?–0.03)
WBC # BLD AUTO: 16.1 THOU/UL (ref 4.8–10.8)
WBC UR QL AUTO: (no result) HPF (ref 0–3)

## 2021-12-06 PROCEDURE — 83605 ASSAY OF LACTIC ACID: CPT

## 2021-12-06 PROCEDURE — 84484 ASSAY OF TROPONIN QUANT: CPT

## 2021-12-06 PROCEDURE — 80053 COMPREHEN METABOLIC PANEL: CPT

## 2021-12-06 PROCEDURE — 36415 COLL VENOUS BLD VENIPUNCTURE: CPT

## 2021-12-06 PROCEDURE — 90471 IMMUNIZATION ADMIN: CPT

## 2021-12-06 PROCEDURE — 82550 ASSAY OF CK (CPK): CPT

## 2021-12-06 PROCEDURE — 51701 INSERT BLADDER CATHETER: CPT

## 2021-12-06 PROCEDURE — 87086 URINE CULTURE/COLONY COUNT: CPT

## 2021-12-06 PROCEDURE — 84443 ASSAY THYROID STIM HORMONE: CPT

## 2021-12-06 PROCEDURE — U0002 COVID-19 LAB TEST NON-CDC: HCPCS

## 2021-12-06 PROCEDURE — 85730 THROMBOPLASTIN TIME PARTIAL: CPT

## 2021-12-06 PROCEDURE — 82803 BLOOD GASES ANY COMBINATION: CPT

## 2021-12-06 PROCEDURE — 80306 DRUG TEST PRSMV INSTRMNT: CPT

## 2021-12-06 PROCEDURE — 83036 HEMOGLOBIN GLYCOSYLATED A1C: CPT

## 2021-12-06 PROCEDURE — 90686 IIV4 VACC NO PRSV 0.5 ML IM: CPT

## 2021-12-06 PROCEDURE — 82140 ASSAY OF AMMONIA: CPT

## 2021-12-06 PROCEDURE — 95819 EEG AWAKE AND ASLEEP: CPT

## 2021-12-06 PROCEDURE — 80307 DRUG TEST PRSMV CHEM ANLYZR: CPT

## 2021-12-06 PROCEDURE — 96375 TX/PRO/DX INJ NEW DRUG ADDON: CPT

## 2021-12-06 PROCEDURE — 81003 URINALYSIS AUTO W/O SCOPE: CPT

## 2021-12-06 PROCEDURE — 84703 CHORIONIC GONADOTROPIN ASSAY: CPT

## 2021-12-06 PROCEDURE — 70450 CT HEAD/BRAIN W/O DYE: CPT

## 2021-12-06 PROCEDURE — 93005 ELECTROCARDIOGRAM TRACING: CPT

## 2021-12-06 PROCEDURE — G0008 ADMIN INFLUENZA VIRUS VAC: HCPCS

## 2021-12-06 PROCEDURE — 82553 CREATINE MB FRACTION: CPT

## 2021-12-06 PROCEDURE — 85025 COMPLETE CBC W/AUTO DIFF WBC: CPT

## 2021-12-06 PROCEDURE — 95957 EEG DIGITAL ANALYSIS: CPT

## 2021-12-06 PROCEDURE — 96374 THER/PROPH/DIAG INJ IV PUSH: CPT

## 2021-12-06 PROCEDURE — 82330 ASSAY OF CALCIUM: CPT

## 2021-12-06 PROCEDURE — 87040 BLOOD CULTURE FOR BACTERIA: CPT

## 2021-12-06 PROCEDURE — 80061 LIPID PANEL: CPT

## 2021-12-06 PROCEDURE — 95816 EEG AWAKE AND DROWSY: CPT

## 2021-12-06 PROCEDURE — 36416 COLLJ CAPILLARY BLOOD SPEC: CPT

## 2021-12-06 PROCEDURE — 85610 PROTHROMBIN TIME: CPT

## 2021-12-06 PROCEDURE — 96372 THER/PROPH/DIAG INJ SC/IM: CPT

## 2021-12-06 PROCEDURE — 84100 ASSAY OF PHOSPHORUS: CPT

## 2021-12-06 PROCEDURE — 71045 X-RAY EXAM CHEST 1 VIEW: CPT

## 2021-12-06 PROCEDURE — 82010 KETONE BODYS QUAN: CPT

## 2021-12-06 PROCEDURE — 81015 MICROSCOPIC EXAM OF URINE: CPT

## 2021-12-06 PROCEDURE — 83690 ASSAY OF LIPASE: CPT

## 2021-12-06 PROCEDURE — 93306 TTE W/DOPPLER COMPLETE: CPT

## 2021-12-06 PROCEDURE — 83735 ASSAY OF MAGNESIUM: CPT

## 2021-12-06 RX ADMIN — BRIMONIDINE TARTRATE SCH: 2 SOLUTION OPHTHALMIC at 15:53

## 2021-12-06 RX ADMIN — CEFTRIAXONE SCH MLS: 1 INJECTION, POWDER, FOR SOLUTION INTRAMUSCULAR; INTRAVENOUS at 09:45

## 2021-12-06 RX ADMIN — BRIMONIDINE TARTRATE SCH DROP: 2 SOLUTION OPHTHALMIC at 20:47

## 2021-12-06 RX ADMIN — INSULIN LISPRO PRN UNIT: 100 INJECTION, SOLUTION INTRAVENOUS; SUBCUTANEOUS at 09:50

## 2021-12-07 VITALS — TEMPERATURE: 97.4 F

## 2021-12-07 VITALS — SYSTOLIC BLOOD PRESSURE: 143 MMHG | DIASTOLIC BLOOD PRESSURE: 67 MMHG

## 2021-12-07 LAB
ALBUMIN SERPL BCG-MCNC: 2.8 G/DL (ref 3.5–5)
ALP SERPL-CCNC: 55 U/L (ref 40–110)
ALT SERPL W P-5'-P-CCNC: 16 U/L (ref 8–55)
ANION GAP SERPL CALC-SCNC: 11 MMOL/L (ref 10–20)
AST SERPL-CCNC: 22 U/L (ref 5–34)
BASOPHILS # BLD AUTO: 0.1 THOU/UL (ref 0–0.2)
BASOPHILS NFR BLD AUTO: 0.9 % (ref 0–1)
BILIRUB SERPL-MCNC: 0.5 MG/DL (ref 0.2–1.2)
BUN SERPL-MCNC: 22 MG/DL (ref 9.8–20.1)
CALCIUM SERPL-MCNC: 8.8 MG/DL (ref 7.8–10.44)
CHLORIDE SERPL-SCNC: 106 MMOL/L (ref 98–107)
CO2 SERPL-SCNC: 22 MMOL/L (ref 22–29)
CREAT CL PREDICTED SERPL C-G-VRATE: 43 ML/MIN (ref 70–130)
EOSINOPHIL # BLD AUTO: 0.1 THOU/UL (ref 0–0.7)
EOSINOPHIL NFR BLD AUTO: 1.5 % (ref 0–10)
GLOBULIN SER CALC-MCNC: 3.3 G/DL (ref 2.4–3.5)
GLUCOSE SERPL-MCNC: 194 MG/DL (ref 70–105)
HGB BLD-MCNC: 12.6 G/DL (ref 12–16)
LYMPHOCYTES # BLD: 1.9 THOU/UL (ref 1.2–3.4)
LYMPHOCYTES NFR BLD AUTO: 26.6 % (ref 21–51)
MCH RBC QN AUTO: 32.7 PG (ref 27–31)
MCV RBC AUTO: 96.7 FL (ref 78–98)
MONOCYTES # BLD AUTO: 0.7 THOU/UL (ref 0.11–0.59)
MONOCYTES NFR BLD AUTO: 9.8 % (ref 0–10)
NEUTROPHILS # BLD AUTO: 4.3 THOU/UL (ref 1.4–6.5)
NEUTROPHILS NFR BLD AUTO: 61.1 % (ref 42–75)
PLATELET # BLD AUTO: 187 THOU/UL (ref 130–400)
POTASSIUM SERPL-SCNC: 3.5 MMOL/L (ref 3.5–5.1)
RBC # BLD AUTO: 3.86 MILL/UL (ref 4.2–5.4)
SODIUM SERPL-SCNC: 135 MMOL/L (ref 136–145)
WBC # BLD AUTO: 7.1 THOU/UL (ref 4.8–10.8)

## 2021-12-07 RX ADMIN — BRIMONIDINE TARTRATE SCH DROP: 2 SOLUTION OPHTHALMIC at 08:42

## 2021-12-07 RX ADMIN — INSULIN LISPRO PRN UNIT: 100 INJECTION, SOLUTION INTRAVENOUS; SUBCUTANEOUS at 06:16

## 2021-12-07 RX ADMIN — CEFTRIAXONE SCH MLS: 1 INJECTION, POWDER, FOR SOLUTION INTRAMUSCULAR; INTRAVENOUS at 08:49

## 2022-01-07 ENCOUNTER — HOSPITAL ENCOUNTER (INPATIENT)
Dept: HOSPITAL 92 - ERS | Age: 52
LOS: 4 days | Discharge: HOME | DRG: 280 | End: 2022-01-11
Attending: INTERNAL MEDICINE | Admitting: STUDENT IN AN ORGANIZED HEALTH CARE EDUCATION/TRAINING PROGRAM
Payer: OTHER GOVERNMENT

## 2022-01-07 ENCOUNTER — HOSPITAL ENCOUNTER (EMERGENCY)
Dept: HOSPITAL 9 - MADERS | Age: 52
Discharge: ANOTHER HOSPITAL | End: 2022-01-07
Payer: OTHER GOVERNMENT

## 2022-01-07 VITALS — BODY MASS INDEX: 20.5 KG/M2

## 2022-01-07 DIAGNOSIS — Z87.891: ICD-10-CM

## 2022-01-07 DIAGNOSIS — Z79.899: ICD-10-CM

## 2022-01-07 DIAGNOSIS — H40.9: ICD-10-CM

## 2022-01-07 DIAGNOSIS — F41.8: ICD-10-CM

## 2022-01-07 DIAGNOSIS — E78.5: ICD-10-CM

## 2022-01-07 DIAGNOSIS — Z88.8: ICD-10-CM

## 2022-01-07 DIAGNOSIS — G93.41: ICD-10-CM

## 2022-01-07 DIAGNOSIS — E11.65: ICD-10-CM

## 2022-01-07 DIAGNOSIS — E78.00: ICD-10-CM

## 2022-01-07 DIAGNOSIS — I50.32: ICD-10-CM

## 2022-01-07 DIAGNOSIS — I25.10: ICD-10-CM

## 2022-01-07 DIAGNOSIS — Z79.4: ICD-10-CM

## 2022-01-07 DIAGNOSIS — E11.22: ICD-10-CM

## 2022-01-07 DIAGNOSIS — N18.32: ICD-10-CM

## 2022-01-07 DIAGNOSIS — I10: ICD-10-CM

## 2022-01-07 DIAGNOSIS — I13.0: ICD-10-CM

## 2022-01-07 DIAGNOSIS — Z88.5: ICD-10-CM

## 2022-01-07 DIAGNOSIS — I21.4: Primary | ICD-10-CM

## 2022-01-07 DIAGNOSIS — E11.40: ICD-10-CM

## 2022-01-07 DIAGNOSIS — Z20.822: ICD-10-CM

## 2022-01-07 DIAGNOSIS — I48.91: ICD-10-CM

## 2022-01-07 DIAGNOSIS — E11.10: Primary | ICD-10-CM

## 2022-01-07 DIAGNOSIS — I21.9: ICD-10-CM

## 2022-01-07 DIAGNOSIS — E11.51: ICD-10-CM

## 2022-01-07 DIAGNOSIS — E78.2: ICD-10-CM

## 2022-01-07 LAB
ALBUMIN SERPL BCG-MCNC: 3.1 G/DL (ref 3.5–5)
ALBUMIN SERPL BCG-MCNC: 3.9 G/DL (ref 3.5–5)
ALP SERPL-CCNC: 74 U/L (ref 40–110)
ALP SERPL-CCNC: 90 U/L (ref 40–110)
ALT SERPL W P-5'-P-CCNC: 22 U/L (ref 8–55)
ALT SERPL W P-5'-P-CCNC: 31 U/L (ref 8–55)
ANION GAP SERPL CALC-SCNC: 14 MMOL/L (ref 10–20)
ANION GAP SERPL CALC-SCNC: 22 MMOL/L (ref 10–20)
AST SERPL-CCNC: 28 U/L (ref 5–34)
AST SERPL-CCNC: 30 U/L (ref 5–34)
BACTERIA UR QL AUTO: (no result) HPF
BASOPHILS # BLD AUTO: 0 THOU/UL (ref 0–0.2)
BASOPHILS # BLD AUTO: 0.1 THOU/UL (ref 0–0.2)
BASOPHILS NFR BLD AUTO: 0.4 % (ref 0–1)
BASOPHILS NFR BLD AUTO: 0.8 % (ref 0–1)
BILIRUB SERPL-MCNC: 0.3 MG/DL (ref 0.2–1.2)
BILIRUB SERPL-MCNC: 0.6 MG/DL (ref 0.2–1.2)
BUN SERPL-MCNC: 12 MG/DL (ref 9.8–20.1)
BUN SERPL-MCNC: 14 MG/DL (ref 9.8–20.1)
CALCIUM SERPL-MCNC: 10.2 MG/DL (ref 7.8–10.44)
CALCIUM SERPL-MCNC: 8.2 MG/DL (ref 7.8–10.44)
CHLORIDE SERPL-SCNC: 103 MMOL/L (ref 98–107)
CHLORIDE SERPL-SCNC: 99 MMOL/L (ref 98–107)
CK MB SERPL-MCNC: 17 NG/ML (ref 0–6.6)
CO2 SERPL-SCNC: 22 MMOL/L (ref 22–29)
CO2 SERPL-SCNC: 25 MMOL/L (ref 22–29)
COMM CRITICAL RESULTS DOC: 0
CREAT CL PREDICTED SERPL C-G-VRATE: 0 ML/MIN (ref 70–130)
CREAT CL PREDICTED SERPL C-G-VRATE: 0 ML/MIN (ref 70–130)
EOSINOPHIL # BLD AUTO: 0 THOU/UL (ref 0–0.7)
EOSINOPHIL # BLD AUTO: 0 THOU/UL (ref 0–0.7)
EOSINOPHIL NFR BLD AUTO: 0.1 % (ref 0–10)
EOSINOPHIL NFR BLD AUTO: 0.1 % (ref 0–10)
GLOBULIN SER CALC-MCNC: 2.9 G/DL (ref 2.4–3.5)
GLOBULIN SER CALC-MCNC: 3.7 G/DL (ref 2.4–3.5)
GLUCOSE SERPL-MCNC: 295 MG/DL (ref 70–105)
GLUCOSE SERPL-MCNC: 425 MG/DL (ref 70–105)
GLUCOSE UR STRIP-MCNC: >=1000 MG/DL
HGB BLD-MCNC: 14 G/DL (ref 12–16)
HGB BLD-MCNC: 17.1 G/DL (ref 12–16)
LYMPHOCYTES # BLD AUTO: 1.4 THOU/UL (ref 1.2–3.4)
LYMPHOCYTES # BLD: 1 THOU/UL (ref 1.2–3.4)
LYMPHOCYTES NFR BLD AUTO: 10.8 % (ref 21–51)
LYMPHOCYTES NFR BLD AUTO: 9.6 % (ref 21–51)
MCH RBC QN AUTO: 30.3 PG (ref 27–31)
MCH RBC QN AUTO: 31.7 PG (ref 27–31)
MCV RBC AUTO: 92.8 FL (ref 78–98)
MCV RBC AUTO: 94.9 FL (ref 78–98)
MONOCYTES # BLD AUTO: 0.5 THOU/UL (ref 0.11–0.59)
MONOCYTES # BLD AUTO: 0.7 THOU/UL (ref 0.11–0.59)
MONOCYTES NFR BLD AUTO: 3.9 % (ref 0–10)
MONOCYTES NFR BLD AUTO: 6.1 % (ref 0–10)
NEUTROPHILS # BLD AUTO: 10.8 THOU/UL (ref 1.4–6.5)
NEUTROPHILS # BLD AUTO: 9.1 THOU/UL (ref 1.4–6.5)
NEUTROPHILS NFR BLD AUTO: 83.9 % (ref 42–75)
NEUTROPHILS NFR BLD AUTO: 84.5 % (ref 42–75)
PLATELET # BLD AUTO: 207 THOU/UL (ref 130–400)
PLATELET # BLD AUTO: 260 THOU/UL (ref 130–400)
POTASSIUM SERPL-SCNC: 3.7 MMOL/L (ref 3.5–5.1)
POTASSIUM SERPL-SCNC: 3.8 MMOL/L (ref 3.5–5.1)
PROT UR STRIP.AUTO-MCNC: (no result) MG/DL
RBC # BLD AUTO: 4.42 MILL/UL (ref 4.2–5.4)
RBC # BLD AUTO: 5.64 MILL/UL (ref 4.2–5.4)
SODIUM SERPL-SCNC: 138 MMOL/L (ref 136–145)
SODIUM SERPL-SCNC: 139 MMOL/L (ref 136–145)
SP GR UR STRIP: 1.02 (ref 1–1.03)
WBC # BLD AUTO: 10.8 THOU/UL (ref 4.8–10.8)
WBC # BLD AUTO: 12.8 THOU/UL (ref 4.8–10.8)
WBC UR QL AUTO: (no result) HPF (ref 0–3)

## 2022-01-07 PROCEDURE — 86780 TREPONEMA PALLIDUM: CPT

## 2022-01-07 PROCEDURE — 96372 THER/PROPH/DIAG INJ SC/IM: CPT

## 2022-01-07 PROCEDURE — 36416 COLLJ CAPILLARY BLOOD SPEC: CPT

## 2022-01-07 PROCEDURE — 99153 MOD SED SAME PHYS/QHP EA: CPT

## 2022-01-07 PROCEDURE — 71045 X-RAY EXAM CHEST 1 VIEW: CPT

## 2022-01-07 PROCEDURE — 99152 MOD SED SAME PHYS/QHP 5/>YRS: CPT

## 2022-01-07 PROCEDURE — 84484 ASSAY OF TROPONIN QUANT: CPT

## 2022-01-07 PROCEDURE — 81001 URINALYSIS AUTO W/SCOPE: CPT

## 2022-01-07 PROCEDURE — 81003 URINALYSIS AUTO W/O SCOPE: CPT

## 2022-01-07 PROCEDURE — 81015 MICROSCOPIC EXAM OF URINE: CPT

## 2022-01-07 PROCEDURE — 70450 CT HEAD/BRAIN W/O DYE: CPT

## 2022-01-07 PROCEDURE — 96375 TX/PRO/DX INJ NEW DRUG ADDON: CPT

## 2022-01-07 PROCEDURE — 85025 COMPLETE CBC W/AUTO DIFF WBC: CPT

## 2022-01-07 PROCEDURE — 96374 THER/PROPH/DIAG INJ IV PUSH: CPT

## 2022-01-07 PROCEDURE — 84439 ASSAY OF FREE THYROXINE: CPT

## 2022-01-07 PROCEDURE — 84425 ASSAY OF VITAMIN B-1: CPT

## 2022-01-07 PROCEDURE — 80048 BASIC METABOLIC PNL TOTAL CA: CPT

## 2022-01-07 PROCEDURE — 80306 DRUG TEST PRSMV INSTRMNT: CPT

## 2022-01-07 PROCEDURE — 93010 ELECTROCARDIOGRAM REPORT: CPT

## 2022-01-07 PROCEDURE — 36415 COLL VENOUS BLD VENIPUNCTURE: CPT

## 2022-01-07 PROCEDURE — 82140 ASSAY OF AMMONIA: CPT

## 2022-01-07 PROCEDURE — U0002 COVID-19 LAB TEST NON-CDC: HCPCS

## 2022-01-07 PROCEDURE — 82607 VITAMIN B-12: CPT

## 2022-01-07 PROCEDURE — 75625 CONTRAST EXAM ABDOMINL AORTA: CPT

## 2022-01-07 PROCEDURE — 87040 BLOOD CULTURE FOR BACTERIA: CPT

## 2022-01-07 PROCEDURE — 94760 N-INVAS EAR/PLS OXIMETRY 1: CPT

## 2022-01-07 PROCEDURE — 93459 L HRT ART/GRFT ANGIO: CPT

## 2022-01-07 PROCEDURE — 82553 CREATINE MB FRACTION: CPT

## 2022-01-07 PROCEDURE — 93005 ELECTROCARDIOGRAM TRACING: CPT

## 2022-01-07 PROCEDURE — 82010 KETONE BODYS QUAN: CPT

## 2022-01-07 PROCEDURE — 84443 ASSAY THYROID STIM HORMONE: CPT

## 2022-01-07 PROCEDURE — 96365 THER/PROPH/DIAG IV INF INIT: CPT

## 2022-01-07 PROCEDURE — 84481 FREE ASSAY (FT-3): CPT

## 2022-01-07 PROCEDURE — 83605 ASSAY OF LACTIC ACID: CPT

## 2022-01-07 PROCEDURE — 83735 ASSAY OF MAGNESIUM: CPT

## 2022-01-07 PROCEDURE — 80053 COMPREHEN METABOLIC PANEL: CPT

## 2022-01-08 LAB
ANION GAP SERPL CALC-SCNC: 13 MMOL/L (ref 10–20)
ANION GAP SERPL CALC-SCNC: 15 MMOL/L (ref 10–20)
BASOPHILS # BLD AUTO: 0 THOU/UL (ref 0–0.2)
BASOPHILS # BLD AUTO: 0 THOU/UL (ref 0–0.2)
BASOPHILS NFR BLD AUTO: 0.1 % (ref 0–1)
BASOPHILS NFR BLD AUTO: 0.4 % (ref 0–1)
BUN SERPL-MCNC: 12 MG/DL (ref 9.8–20.1)
BUN SERPL-MCNC: 12 MG/DL (ref 9.8–20.1)
CALCIUM SERPL-MCNC: 8.7 MG/DL (ref 7.8–10.44)
CALCIUM SERPL-MCNC: 8.9 MG/DL (ref 7.8–10.44)
CHLORIDE SERPL-SCNC: 104 MMOL/L (ref 98–107)
CHLORIDE SERPL-SCNC: 104 MMOL/L (ref 98–107)
CK MB SERPL-MCNC: 20.3 NG/ML (ref 0–6.6)
CO2 SERPL-SCNC: 22 MMOL/L (ref 22–29)
CO2 SERPL-SCNC: 24 MMOL/L (ref 22–29)
COMM CRITICAL RESULTS DOC: (no result)
CREAT CL PREDICTED SERPL C-G-VRATE: 53 ML/MIN (ref 70–130)
CREAT CL PREDICTED SERPL C-G-VRATE: 53 ML/MIN (ref 70–130)
DRUG SCREEN CUTOFF: (no result)
EOSINOPHIL # BLD AUTO: 0 THOU/UL (ref 0–0.7)
EOSINOPHIL # BLD AUTO: 0 THOU/UL (ref 0–0.7)
EOSINOPHIL NFR BLD AUTO: 0.1 % (ref 0–10)
EOSINOPHIL NFR BLD AUTO: 0.1 % (ref 0–10)
GLUCOSE SERPL-MCNC: 263 MG/DL (ref 70–105)
GLUCOSE SERPL-MCNC: 267 MG/DL (ref 70–105)
HGB BLD-MCNC: 14.6 G/DL (ref 12–16)
HGB BLD-MCNC: 14.9 G/DL (ref 12–16)
LYMPHOCYTES # BLD: 1.4 THOU/UL (ref 1.2–3.4)
LYMPHOCYTES # BLD: 1.5 THOU/UL (ref 1.2–3.4)
LYMPHOCYTES NFR BLD AUTO: 11.2 % (ref 21–51)
LYMPHOCYTES NFR BLD AUTO: 12.7 % (ref 21–51)
MCH RBC QN AUTO: 32 PG (ref 27–31)
MCH RBC QN AUTO: 33.4 PG (ref 27–31)
MCV RBC AUTO: 94.9 FL (ref 78–98)
MCV RBC AUTO: 95.5 FL (ref 78–98)
MONOCYTES # BLD AUTO: 0.9 THOU/UL (ref 0.11–0.59)
MONOCYTES # BLD AUTO: 1 THOU/UL (ref 0.11–0.59)
MONOCYTES NFR BLD AUTO: 6.8 % (ref 0–10)
MONOCYTES NFR BLD AUTO: 8.1 % (ref 0–10)
NEUTROPHILS # BLD AUTO: 10.2 THOU/UL (ref 1.4–6.5)
NEUTROPHILS # BLD AUTO: 9.4 THOU/UL (ref 1.4–6.5)
NEUTROPHILS NFR BLD AUTO: 79 % (ref 42–75)
NEUTROPHILS NFR BLD AUTO: 81.6 % (ref 42–75)
PLATELET # BLD AUTO: 184 THOU/UL (ref 130–400)
PLATELET # BLD AUTO: 190 THOU/UL (ref 130–400)
POTASSIUM SERPL-SCNC: 3.2 MMOL/L (ref 3.5–5.1)
POTASSIUM SERPL-SCNC: 3.4 MMOL/L (ref 3.5–5.1)
PROT UR STRIP.AUTO-MCNC: 300 MG/DL
RBC # BLD AUTO: 4.46 MILL/UL (ref 4.2–5.4)
RBC # BLD AUTO: 4.57 MILL/UL (ref 4.2–5.4)
RBC UR QL AUTO: (no result) HPF (ref 0–3)
SODIUM SERPL-SCNC: 138 MMOL/L (ref 136–145)
SODIUM SERPL-SCNC: 138 MMOL/L (ref 136–145)
SP GR UR STRIP: 1.03 (ref 1–1.04)
TROPONIN I SERPL DL<=0.01 NG/ML-MCNC: 5.17 NG/ML (ref ?–0.03)
TROPONIN I SERPL DL<=0.01 NG/ML-MCNC: 9.93 NG/ML (ref ?–0.03)
WBC # BLD AUTO: 11.9 THOU/UL (ref 4.8–10.8)
WBC # BLD AUTO: 12.6 THOU/UL (ref 4.8–10.8)
WBC UR QL AUTO: (no result) HPF (ref 0–3)

## 2022-01-08 RX ADMIN — BRIMONIDINE TARTRATE SCH: 2 SOLUTION OPHTHALMIC at 11:13

## 2022-01-08 RX ADMIN — INSULIN LISPRO PRN UNIT: 100 INJECTION, SOLUTION INTRAVENOUS; SUBCUTANEOUS at 10:54

## 2022-01-08 RX ADMIN — BRIMONIDINE TARTRATE SCH DROP: 2 SOLUTION OPHTHALMIC at 20:29

## 2022-01-08 RX ADMIN — POTASSIUM CHLORIDE SCH MLS: 14.9 INJECTION, SOLUTION INTRAVENOUS at 16:49

## 2022-01-08 RX ADMIN — ASPIRIN 81 MG CHEWABLE TABLET SCH MG: 81 TABLET CHEWABLE at 10:16

## 2022-01-08 RX ADMIN — ONDANSETRON PRN MG: 2 INJECTION INTRAMUSCULAR; INTRAVENOUS at 06:09

## 2022-01-08 RX ADMIN — INSULIN LISPRO PRN UNIT: 100 INJECTION, SOLUTION INTRAVENOUS; SUBCUTANEOUS at 16:54

## 2022-01-08 RX ADMIN — POTASSIUM CHLORIDE SCH MLS: 14.9 INJECTION, SOLUTION INTRAVENOUS at 14:04

## 2022-01-08 RX ADMIN — ONDANSETRON PRN MG: 2 INJECTION INTRAMUSCULAR; INTRAVENOUS at 22:50

## 2022-01-08 RX ADMIN — ONDANSETRON PRN MG: 2 INJECTION INTRAMUSCULAR; INTRAVENOUS at 14:05

## 2022-01-09 LAB
ANION GAP SERPL CALC-SCNC: 17 MMOL/L (ref 10–20)
BASOPHILS # BLD AUTO: 0 THOU/UL (ref 0–0.2)
BASOPHILS NFR BLD AUTO: 0.3 % (ref 0–1)
BUN SERPL-MCNC: 12 MG/DL (ref 9.8–20.1)
CALCIUM SERPL-MCNC: 8.9 MG/DL (ref 7.8–10.44)
CHLORIDE SERPL-SCNC: 105 MMOL/L (ref 98–107)
CO2 SERPL-SCNC: 19 MMOL/L (ref 22–29)
CREAT CL PREDICTED SERPL C-G-VRATE: 60 ML/MIN (ref 70–130)
EOSINOPHIL # BLD AUTO: 0 THOU/UL (ref 0–0.7)
EOSINOPHIL NFR BLD AUTO: 0.3 % (ref 0–10)
GLUCOSE SERPL-MCNC: 204 MG/DL (ref 70–105)
HGB BLD-MCNC: 14.7 G/DL (ref 12–16)
LYMPHOCYTES # BLD: 1.8 THOU/UL (ref 1.2–3.4)
LYMPHOCYTES NFR BLD AUTO: 14.7 % (ref 21–51)
MCH RBC QN AUTO: 32.8 PG (ref 27–31)
MCV RBC AUTO: 95 FL (ref 78–98)
MONOCYTES # BLD AUTO: 0.9 THOU/UL (ref 0.11–0.59)
MONOCYTES NFR BLD AUTO: 7.3 % (ref 0–10)
NEUTROPHILS # BLD AUTO: 9.2 THOU/UL (ref 1.4–6.5)
NEUTROPHILS NFR BLD AUTO: 77.4 % (ref 42–75)
PLATELET # BLD AUTO: 170 THOU/UL (ref 130–400)
POTASSIUM SERPL-SCNC: 3.7 MMOL/L (ref 3.5–5.1)
RBC # BLD AUTO: 4.47 MILL/UL (ref 4.2–5.4)
SODIUM SERPL-SCNC: 137 MMOL/L (ref 136–145)
T4 FREE SERPL-MCNC: 1.14 NG/DL (ref 0.7–1.48)
TSH SERPL DL<=0.005 MIU/L-ACNC: 0.59 UIU/ML (ref 0.35–4.94)
VIT B12 SERPL-MCNC: 329 PG/ML (ref 211–911)
WBC # BLD AUTO: 11.9 THOU/UL (ref 4.8–10.8)

## 2022-01-09 RX ADMIN — BRIMONIDINE TARTRATE SCH DROP: 2 SOLUTION OPHTHALMIC at 11:12

## 2022-01-09 RX ADMIN — INSULIN LISPRO PRN UNIT: 100 INJECTION, SOLUTION INTRAVENOUS; SUBCUTANEOUS at 18:37

## 2022-01-09 RX ADMIN — ASPIRIN 81 MG CHEWABLE TABLET SCH MG: 81 TABLET CHEWABLE at 11:09

## 2022-01-09 RX ADMIN — BRIMONIDINE TARTRATE SCH DROP: 2 SOLUTION OPHTHALMIC at 21:49

## 2022-01-09 RX ADMIN — ONDANSETRON PRN MG: 2 INJECTION INTRAMUSCULAR; INTRAVENOUS at 18:37

## 2022-01-09 RX ADMIN — INSULIN LISPRO PRN UNIT: 100 INJECTION, SOLUTION INTRAVENOUS; SUBCUTANEOUS at 06:02

## 2022-01-09 RX ADMIN — INSULIN GLARGINE SCH UNIT: 100 INJECTION, SOLUTION SUBCUTANEOUS at 11:12

## 2022-01-10 LAB
ANION GAP SERPL CALC-SCNC: 11 MMOL/L (ref 10–20)
ANION GAP SERPL CALC-SCNC: 11 MMOL/L (ref 10–20)
BASOPHILS # BLD AUTO: 0 THOU/UL (ref 0–0.2)
BASOPHILS NFR BLD AUTO: 0.3 % (ref 0–1)
BUN SERPL-MCNC: 16 MG/DL (ref 9.8–20.1)
BUN SERPL-MCNC: 17 MG/DL (ref 9.8–20.1)
CALCIUM SERPL-MCNC: 8.4 MG/DL (ref 7.8–10.44)
CALCIUM SERPL-MCNC: 8.6 MG/DL (ref 7.8–10.44)
CHLORIDE SERPL-SCNC: 96 MMOL/L (ref 98–107)
CHLORIDE SERPL-SCNC: 97 MMOL/L (ref 98–107)
CO2 SERPL-SCNC: 28 MMOL/L (ref 22–29)
CO2 SERPL-SCNC: 29 MMOL/L (ref 22–29)
CREAT CL PREDICTED SERPL C-G-VRATE: 55 ML/MIN (ref 70–130)
CREAT CL PREDICTED SERPL C-G-VRATE: 64 ML/MIN (ref 70–130)
EOSINOPHIL # BLD AUTO: 0 THOU/UL (ref 0–0.7)
EOSINOPHIL NFR BLD AUTO: 0.4 % (ref 0–10)
GLUCOSE SERPL-MCNC: 188 MG/DL (ref 70–105)
GLUCOSE SERPL-MCNC: 246 MG/DL (ref 70–105)
HGB BLD-MCNC: 14.8 G/DL (ref 12–16)
LYMPHOCYTES # BLD: 1.6 THOU/UL (ref 1.2–3.4)
LYMPHOCYTES NFR BLD AUTO: 19.1 % (ref 21–51)
MAGNESIUM SERPL-MCNC: 1.5 MG/DL (ref 1.6–2.6)
MCH RBC QN AUTO: 33 PG (ref 27–31)
MCV RBC AUTO: 93.5 FL (ref 78–98)
MONOCYTES # BLD AUTO: 0.8 THOU/UL (ref 0.11–0.59)
MONOCYTES NFR BLD AUTO: 9.5 % (ref 0–10)
NEUTROPHILS # BLD AUTO: 6 THOU/UL (ref 1.4–6.5)
NEUTROPHILS NFR BLD AUTO: 70.6 % (ref 42–75)
PLATELET # BLD AUTO: 161 THOU/UL (ref 130–400)
POTASSIUM SERPL-SCNC: 2.9 MMOL/L (ref 3.5–5.1)
POTASSIUM SERPL-SCNC: 3.4 MMOL/L (ref 3.5–5.1)
RBC # BLD AUTO: 4.48 MILL/UL (ref 4.2–5.4)
SODIUM SERPL-SCNC: 132 MMOL/L (ref 136–145)
SODIUM SERPL-SCNC: 134 MMOL/L (ref 136–145)
SYPHILIS ANTIBODY INDEX: 0.78 S/CO
WBC # BLD AUTO: 8.4 THOU/UL (ref 4.8–10.8)

## 2022-01-10 RX ADMIN — BRIMONIDINE TARTRATE SCH DROP: 2 SOLUTION OPHTHALMIC at 20:26

## 2022-01-10 RX ADMIN — BRIMONIDINE TARTRATE SCH DROP: 2 SOLUTION OPHTHALMIC at 09:21

## 2022-01-10 RX ADMIN — INSULIN GLARGINE SCH UNIT: 100 INJECTION, SOLUTION SUBCUTANEOUS at 09:21

## 2022-01-10 RX ADMIN — INSULIN LISPRO PRN UNIT: 100 INJECTION, SOLUTION INTRAVENOUS; SUBCUTANEOUS at 18:25

## 2022-01-10 RX ADMIN — ASPIRIN 81 MG CHEWABLE TABLET SCH MG: 81 TABLET CHEWABLE at 09:20

## 2022-01-11 VITALS — SYSTOLIC BLOOD PRESSURE: 161 MMHG | DIASTOLIC BLOOD PRESSURE: 74 MMHG | TEMPERATURE: 97.7 F

## 2022-01-11 LAB
ANION GAP SERPL CALC-SCNC: 15 MMOL/L (ref 10–20)
BASOPHILS # BLD AUTO: 0 THOU/UL (ref 0–0.2)
BASOPHILS NFR BLD AUTO: 0.8 % (ref 0–1)
BUN SERPL-MCNC: 20 MG/DL (ref 9.8–20.1)
CALCIUM SERPL-MCNC: 8.6 MG/DL (ref 7.8–10.44)
CHLORIDE SERPL-SCNC: 104 MMOL/L (ref 98–107)
CO2 SERPL-SCNC: 22 MMOL/L (ref 22–29)
CREAT CL PREDICTED SERPL C-G-VRATE: 57 ML/MIN (ref 70–130)
EOSINOPHIL # BLD AUTO: 0.1 THOU/UL (ref 0–0.7)
EOSINOPHIL NFR BLD AUTO: 2.5 % (ref 0–10)
GLUCOSE SERPL-MCNC: 142 MG/DL (ref 70–105)
HGB BLD-MCNC: 15.2 G/DL (ref 12–16)
LYMPHOCYTES # BLD: 1 THOU/UL (ref 1.2–3.4)
LYMPHOCYTES NFR BLD AUTO: 18.8 % (ref 21–51)
MAGNESIUM SERPL-MCNC: 1.9 MG/DL (ref 1.6–2.6)
MCH RBC QN AUTO: 33.4 PG (ref 27–31)
MCV RBC AUTO: 94.5 FL (ref 78–98)
MONOCYTES # BLD AUTO: 0.7 THOU/UL (ref 0.11–0.59)
MONOCYTES NFR BLD AUTO: 13.8 % (ref 0–10)
NEUTROPHILS # BLD AUTO: 3.3 THOU/UL (ref 1.4–6.5)
NEUTROPHILS NFR BLD AUTO: 64.1 % (ref 42–75)
PLATELET # BLD AUTO: 151 THOU/UL (ref 130–400)
POTASSIUM SERPL-SCNC: 5 MMOL/L (ref 3.5–5.1)
RBC # BLD AUTO: 4.55 MILL/UL (ref 4.2–5.4)
SODIUM SERPL-SCNC: 136 MMOL/L (ref 136–145)
WBC # BLD AUTO: 5.1 THOU/UL (ref 4.8–10.8)

## 2022-01-11 PROCEDURE — B2131ZZ FLUOROSCOPY OF MULTIPLE CORONARY ARTERY BYPASS GRAFTS USING LOW OSMOLAR CONTRAST: ICD-10-PCS | Performed by: INTERNAL MEDICINE

## 2022-01-11 PROCEDURE — 4A023N7 MEASUREMENT OF CARDIAC SAMPLING AND PRESSURE, LEFT HEART, PERCUTANEOUS APPROACH: ICD-10-PCS | Performed by: INTERNAL MEDICINE

## 2022-01-11 RX ADMIN — INSULIN GLARGINE SCH: 100 INJECTION, SOLUTION SUBCUTANEOUS at 13:20

## 2022-01-11 RX ADMIN — BRIMONIDINE TARTRATE SCH: 2 SOLUTION OPHTHALMIC at 13:18

## 2022-01-11 RX ADMIN — ASPIRIN 81 MG CHEWABLE TABLET SCH MG: 81 TABLET CHEWABLE at 15:23
